# Patient Record
Sex: FEMALE | Race: ASIAN | NOT HISPANIC OR LATINO | Employment: OTHER | URBAN - METROPOLITAN AREA
[De-identification: names, ages, dates, MRNs, and addresses within clinical notes are randomized per-mention and may not be internally consistent; named-entity substitution may affect disease eponyms.]

---

## 2021-04-08 ENCOUNTER — TRANSFERRED RECORDS (OUTPATIENT)
Dept: HEALTH INFORMATION MANAGEMENT | Facility: CLINIC | Age: 31
End: 2021-04-08

## 2022-08-03 ENCOUNTER — TELEPHONE (OUTPATIENT)
Dept: FAMILY MEDICINE | Facility: CLINIC | Age: 32
End: 2022-08-03

## 2022-08-03 NOTE — TELEPHONE ENCOUNTER
OK to change PCP to me.   Please notify that it might take some time to get in to see me and they can see my partners for acute issue if arises.

## 2022-08-03 NOTE — TELEPHONE ENCOUNTER
Reason for Call:  Accepting new patients    Detailed comments: Garth,  of patient, is established with you and would like patient to see you as well. Was told by schedulers your panel is closed. Asked to send request to see if you would be willing to accept her as patient as well. Please advise-thanks!      Phone Number Patient can be reached at: Home number on file [phone] (home)    Best Time: Open    Can we leave a detailed message on this number? YES    Call taken on 8/3/2022 at 2:41 PM by Marla Osuna

## 2022-10-03 ENCOUNTER — HEALTH MAINTENANCE LETTER (OUTPATIENT)
Age: 32
End: 2022-10-03

## 2022-11-22 ENCOUNTER — OFFICE VISIT (OUTPATIENT)
Dept: FAMILY MEDICINE | Facility: CLINIC | Age: 32
End: 2022-11-22
Payer: COMMERCIAL

## 2022-11-22 VITALS
WEIGHT: 158.2 LBS | OXYGEN SATURATION: 99 % | HEIGHT: 63 IN | SYSTOLIC BLOOD PRESSURE: 110 MMHG | DIASTOLIC BLOOD PRESSURE: 76 MMHG | HEART RATE: 109 BPM | TEMPERATURE: 98.5 F | RESPIRATION RATE: 18 BRPM | BODY MASS INDEX: 28.03 KG/M2

## 2022-11-22 DIAGNOSIS — Z13.1 SCREENING FOR DIABETES MELLITUS: ICD-10-CM

## 2022-11-22 DIAGNOSIS — Z11.4 SCREENING FOR HIV (HUMAN IMMUNODEFICIENCY VIRUS): ICD-10-CM

## 2022-11-22 DIAGNOSIS — Z00.00 ROUTINE GENERAL MEDICAL EXAMINATION AT A HEALTH CARE FACILITY: Primary | ICD-10-CM

## 2022-11-22 DIAGNOSIS — Z13.0 SCREENING, ANEMIA, DEFICIENCY, IRON: ICD-10-CM

## 2022-11-22 DIAGNOSIS — Z13.29 SCREENING FOR THYROID DISORDER: ICD-10-CM

## 2022-11-22 DIAGNOSIS — Z13.220 SCREENING FOR HYPERLIPIDEMIA: ICD-10-CM

## 2022-11-22 DIAGNOSIS — Z11.59 NEED FOR HEPATITIS C SCREENING TEST: ICD-10-CM

## 2022-11-22 LAB
ERYTHROCYTE [DISTWIDTH] IN BLOOD BY AUTOMATED COUNT: 14.9 % (ref 10–15)
HCT VFR BLD AUTO: 34.5 % (ref 35–47)
HGB BLD-MCNC: 10.7 G/DL (ref 11.7–15.7)
MCH RBC QN AUTO: 23.1 PG (ref 26.5–33)
MCHC RBC AUTO-ENTMCNC: 31 G/DL (ref 31.5–36.5)
MCV RBC AUTO: 75 FL (ref 78–100)
PLATELET # BLD AUTO: 283 10E3/UL (ref 150–450)
RBC # BLD AUTO: 4.63 10E6/UL (ref 3.8–5.2)
WBC # BLD AUTO: 6.9 10E3/UL (ref 4–11)

## 2022-11-22 PROCEDURE — 86803 HEPATITIS C AB TEST: CPT | Performed by: FAMILY MEDICINE

## 2022-11-22 PROCEDURE — 80061 LIPID PANEL: CPT | Performed by: FAMILY MEDICINE

## 2022-11-22 PROCEDURE — 80053 COMPREHEN METABOLIC PANEL: CPT | Performed by: FAMILY MEDICINE

## 2022-11-22 PROCEDURE — 99385 PREV VISIT NEW AGE 18-39: CPT | Mod: 25 | Performed by: FAMILY MEDICINE

## 2022-11-22 PROCEDURE — 90471 IMMUNIZATION ADMIN: CPT | Performed by: FAMILY MEDICINE

## 2022-11-22 PROCEDURE — 87389 HIV-1 AG W/HIV-1&-2 AB AG IA: CPT | Performed by: FAMILY MEDICINE

## 2022-11-22 PROCEDURE — 84443 ASSAY THYROID STIM HORMONE: CPT | Performed by: FAMILY MEDICINE

## 2022-11-22 PROCEDURE — 85027 COMPLETE CBC AUTOMATED: CPT | Performed by: FAMILY MEDICINE

## 2022-11-22 PROCEDURE — 90715 TDAP VACCINE 7 YRS/> IM: CPT | Performed by: FAMILY MEDICINE

## 2022-11-22 PROCEDURE — 36415 COLL VENOUS BLD VENIPUNCTURE: CPT | Performed by: FAMILY MEDICINE

## 2022-11-22 ASSESSMENT — ENCOUNTER SYMPTOMS
CONSTIPATION: 0
FREQUENCY: 0
NAUSEA: 0
MYALGIAS: 0
COUGH: 0
SORE THROAT: 0
NERVOUS/ANXIOUS: 1
JOINT SWELLING: 0
ARTHRALGIAS: 0
DIZZINESS: 0
DIARRHEA: 0
CHILLS: 0
EYE PAIN: 0
HEMATOCHEZIA: 0
ABDOMINAL PAIN: 0
PALPITATIONS: 0
HEMATURIA: 0
WEAKNESS: 0
DYSURIA: 0
SHORTNESS OF BREATH: 0
HEARTBURN: 0
HEADACHES: 1
PARESTHESIAS: 0
FEVER: 0

## 2022-11-22 NOTE — NURSING NOTE
Prior to immunization administration, verified patients identity using patient s name and date of birth. Please see Immunization Activity for additional information.     Screening Questionnaire for Adult Immunization    Are you sick today?   No   Do you have allergies to medications, food, a vaccine component or latex?   No   Have you ever had a serious reaction after receiving a vaccination?   No   Do you have a long-term health problem with heart, lung, kidney, or metabolic disease (e.g., diabetes), asthma, a blood disorder, no spleen, complement component deficiency, a cochlear implant, or a spinal fluid leak?  Are you on long-term aspirin therapy?   No   Do you have cancer, leukemia, HIV/AIDS, or any other immune system problem?   No   Do you have a parent, brother, or sister with an immune system problem?   No   In the past 3 months, have you taken medications that affect  your immune system, such as prednisone, other steroids, or anticancer drugs; drugs for the treatment of rheumatoid arthritis, Crohn s disease, or psoriasis; or have you had radiation treatments?   No   Have you had a seizure, or a brain or other nervous system problem?   No   During the past year, have you received a transfusion of blood or blood    products, or been given immune (gamma) globulin or antiviral drug?   No   For women: Are you pregnant or is there a chance you could become       pregnant during the next month?   No   Have you received any vaccinations in the past 4 weeks?   No     Immunization questionnaire answers were all negative.        Per orders of Dr. brunson, injection of tdap given by Anitha Vela MA. Patient instructed to remain in clinic for 15 minutes afterwards, and to report any adverse reaction to me immediately.       Screening performed by Anitha Vela MA on 11/22/2022 at 5:11 PM.

## 2022-11-22 NOTE — PROGRESS NOTES
SUBJECTIVE:   CC: Jennifer is an 32 year old who presents for preventive health visit.      Patient has been advised of split billing requirements and indicates understanding: Yes  Healthy Habits:     Getting at least 3 servings of Calcium per day:  Yes    Bi-annual eye exam:  NO    Dental care twice a year:  NO    Sleep apnea or symptoms of sleep apnea:  None    Diet:  Regular (no restrictions)    Frequency of exercise:  1 day/week    Duration of exercise:  15-30 minutes    Taking medications regularly:  Yes    Medication side effects:  Not applicable    PHQ-2 Total Score: 0    Additional concerns today:  No    Today's PHQ-2 Score:   PHQ-2 ( 1999 Pfizer) 11/22/2022   Q1: Little interest or pleasure in doing things 0   Q2: Feeling down, depressed or hopeless 0   PHQ-2 Score 0   Q1: Little interest or pleasure in doing things Not at all   Q2: Feeling down, depressed or hopeless Not at all   PHQ-2 Score 0     Have you ever done Advance Care Planning? (For example, a Health Directive, POLST, or a discussion with a medical provider or your loved ones about your wishes): No, advance care planning information given to patient to review.  Patient declined advance care planning discussion at this time.    Social History     Tobacco Use     Smoking status: Never     Smokeless tobacco: Never   Substance Use Topics     Alcohol use: Not on file     If you drink alcohol do you typically have >3 drinks per day or >7 drinks per week? No    Alcohol Use 11/22/2022   Prescreen: >3 drinks/day or >7 drinks/week? No   Prescreen: >3 drinks/day or >7 drinks/week? -   No flowsheet data found.    Reviewed orders with patient.  Reviewed health maintenance and updated orders accordingly - Yes       Breast Cancer Screening:    Breast CA Risk Assessment (FHS-7) 11/22/2022   Do you have a family history of breast, colon, or ovarian cancer? No / Unknown         Patient under 40 years of age: Routine Mammogram Screening not recommended.  "  Pertinent mammograms are reviewed under the imaging tab.    History of abnormal Pap smear: NO - age 30-65 PAP every 5 years with negative HPV co-testing recommended     Reviewed and updated as needed this visit by clinical staff   Tobacco  Allergies  Meds            Reviewed and updated as needed this visit by Provider    Here with her 3 yo daughter and  Garth.  my patient.     Mother - thyroid, blood pressure.   Father - thyroid and blood pressure.   From [facility]. [place] language but understands English.     Periods regular. No birth control pills.  is using condoms. Wishes to hold off on pap until next time as she is having her mensis.     Was breast feeding but stopped recently. Daughter is 2 years old. Some fatigue lately.     Flu shot through. [facility] day care - just joined. Will start next week.     Living with  and 3 yo daughter.     covid shots in [place].     Review of Systems   Constitutional: Negative for chills and fever.   HENT: Negative for congestion, ear pain, hearing loss and sore throat.    Eyes: Negative for pain and visual disturbance.   Respiratory: Negative for cough and shortness of breath.    Cardiovascular: Negative for chest pain, palpitations and peripheral edema.   Gastrointestinal: Negative for abdominal pain, constipation, diarrhea, heartburn, hematochezia and nausea.   Genitourinary: Negative for dysuria, frequency, genital sores, hematuria and urgency.   Musculoskeletal: Negative for arthralgias, joint swelling and myalgias.   Skin: Negative for rash.   Neurological: Positive for headaches. Negative for dizziness, weakness and paresthesias.   Psychiatric/Behavioral: Positive for mood changes. The patient is nervous/anxious.      OBJECTIVE:   /76 (BP Location: Right arm, Patient Position: Sitting, Cuff Size: Adult Regular)   Pulse 109   Temp 98.5  F (36.9  C) (Temporal)   Resp 18   Ht 1.6 m (5' 2.99\")   Wt 71.8 kg (158 lb 3.2 oz)   SpO2 99%   " BMI 28.03 kg/m    Physical Exam  GENERAL: healthy, alert and no distress  EYES: Eyes grossly normal to inspection, PERRL and conjunctivae and sclerae normal  HENT: ear canals and TM's normal, nose and mouth without ulcers or lesions  NECK: no adenopathy, no asymmetry, masses, or scars and thyroid normal to palpation  RESP: lungs clear to auscultation - no rales, rhonchi or wheezes  CV: regular rate and rhythm, normal S1 S2, no S3 or S4, no murmur, click or rub, no peripheral edema and peripheral pulses strong  ABDOMEN: soft, nontender, no hepatosplenomegaly, no masses and bowel sounds normal  MS: no gross musculoskeletal defects noted, no edema  SKIN: no suspicious lesions or rashes  NEURO: Normal strength and tone, mentation intact and speech normal  PSYCH: mentation appears normal, affect normal/bright    ASSESSMENT/PLAN:   (Z00.00) Routine general medical examination at a health care facility  (primary encounter diagnosis)  Comment:    Plan:  Discussed we can still obtain pap despite mensis. Patient declined. Discussed reliable methods of contraception. They would like to think about it.     (Z11.4) Screening for HIV (human immunodeficiency virus)  Comment:    Plan: HIV Antigen Antibody Combo             (Z11.59) Need for hepatitis C screening test  Comment:    Plan: Hepatitis C Screen Reflex to HCV RNA Quant and         Genotype    (Z13.29) Screening for thyroid disorder  Comment:    Plan: TSH with free T4 reflex             (Z13.0) Screening, anemia, deficiency, iron  Comment:    Plan: CBC with platelets             (Z13.1) Screening for diabetes mellitus  Comment:    Plan: Comprehensive metabolic panel (BMP + Alb, Alk         Phos, ALT, AST, Total. Bili, TP), CANCELED:         Glucose             (Z13.220) Screening for hyperlipidemia  Comment:    Plan: Lipid panel reflex to direct LDL Non-fasting                COUNSELING:  Reviewed preventive health counseling, as reflected in patient instructions    She  reports that she has never smoked. She has never used smokeless tobacco.     Brett La MD, MD  [facility]

## 2022-11-23 LAB
ALBUMIN SERPL BCG-MCNC: 4.3 G/DL (ref 3.5–5.2)
ALP SERPL-CCNC: 108 U/L (ref 35–104)
ALT SERPL W P-5'-P-CCNC: 19 U/L (ref 10–35)
ANION GAP SERPL CALCULATED.3IONS-SCNC: 12 MMOL/L (ref 7–15)
AST SERPL W P-5'-P-CCNC: 21 U/L (ref 10–35)
BILIRUB SERPL-MCNC: 0.2 MG/DL
BUN SERPL-MCNC: 17.9 MG/DL (ref 6–20)
CALCIUM SERPL-MCNC: 8.9 MG/DL (ref 8.6–10)
CHLORIDE SERPL-SCNC: 101 MMOL/L (ref 98–107)
CHOLEST SERPL-MCNC: 180 MG/DL
CREAT SERPL-MCNC: 0.64 MG/DL (ref 0.51–0.95)
DEPRECATED HCO3 PLAS-SCNC: 22 MMOL/L (ref 22–29)
GFR SERPL CREATININE-BSD FRML MDRD: >90 ML/MIN/1.73M2
GLUCOSE SERPL-MCNC: 86 MG/DL (ref 70–99)
HCV AB SERPL QL IA: NONREACTIVE
HDLC SERPL-MCNC: 40 MG/DL
HIV 1+2 AB+HIV1 P24 AG SERPL QL IA: NONREACTIVE
LDLC SERPL CALC-MCNC: 111 MG/DL
NONHDLC SERPL-MCNC: 140 MG/DL
POTASSIUM SERPL-SCNC: 4.1 MMOL/L (ref 3.4–5.3)
PROT SERPL-MCNC: 7.5 G/DL (ref 6.4–8.3)
SODIUM SERPL-SCNC: 135 MMOL/L (ref 136–145)
TRIGL SERPL-MCNC: 145 MG/DL
TSH SERPL DL<=0.005 MIU/L-ACNC: 3.35 UIU/ML (ref 0.3–4.2)

## 2023-10-04 ENCOUNTER — OFFICE VISIT (OUTPATIENT)
Dept: FAMILY MEDICINE | Facility: CLINIC | Age: 33
End: 2023-10-04
Payer: COMMERCIAL

## 2023-10-04 VITALS
RESPIRATION RATE: 20 BRPM | HEIGHT: 64 IN | SYSTOLIC BLOOD PRESSURE: 112 MMHG | OXYGEN SATURATION: 99 % | TEMPERATURE: 97.3 F | BODY MASS INDEX: 26.8 KG/M2 | WEIGHT: 157 LBS | DIASTOLIC BLOOD PRESSURE: 78 MMHG | HEART RATE: 94 BPM

## 2023-10-04 DIAGNOSIS — J06.9 UPPER RESPIRATORY TRACT INFECTION, UNSPECIFIED TYPE: Primary | ICD-10-CM

## 2023-10-04 PROCEDURE — 99213 OFFICE O/P EST LOW 20 MIN: CPT | Performed by: FAMILY MEDICINE

## 2023-10-04 ASSESSMENT — PAIN SCALES - GENERAL: PAINLEVEL: MODERATE PAIN (5)

## 2023-10-04 NOTE — PROGRESS NOTES
"  Assessment & Plan     Upper respiratory tract infection, unspecified type  Repeat home covid test. Home care instructions given. Offered in clinic covid test, they would like to hold off on that. When to seek follow up care discussed.              BMI:   Estimated body mass index is 26.95 kg/m  as calculated from the following:    Height as of this encounter: 1.626 m (5' 4\").    Weight as of this encounter: 71.2 kg (157 lb).           Brett La MD, MD  [facility]    Jerry Rodriguez is a 33 year old, presenting for the following health issues:  Suspected Covid (Pain in head, jaw area, runny nose, cough and sore throat. )      10/4/2023     3:02 PM   Additional Questions   Roomed by Jammie KEEN     History of Present Illness       Reason for visit:  Sore throat and cough    She eats 2-3 servings of fruits and vegetables daily.She consumes 0 sweetened beverage(s) daily.She exercises with enough effort to increase her heart rate 10 to 19 minutes per day.  She exercises with enough effort to increase her heart rate 7 days per week.   She is taking medications regularly.     Going on for last 3 days. Jaw, ear and head pain.   No light sensitivity.   More headaches with lying down. More on left side of face.   Cough with mucus production.   No fever. Chest pain with coughing. No recent travel.  Child does not go to .   Did not take any medication. Home covid test negative yesterday.     Review of Systems         Objective    /78 (BP Location: Left arm, Patient Position: Sitting, Cuff Size: Adult Regular)   Pulse 94   Temp 97.3  F (36.3  C) (Temporal)   Resp 20   Ht 1.626 m (5' 4\")   Wt 71.2 kg (157 lb)   LMP 09/20/2023 (Exact Date)   SpO2 99%   BMI 26.95 kg/m    Body mass index is 26.95 kg/m .  Physical Exam   Lungs clear  Heart RRR  Mild cervical adenopathy  No sinus tenderness.  Both TM clear.                       "

## 2023-10-04 NOTE — PATIENT INSTRUCTIONS
Cough - nyquil for cough during night time and dayquil during daytime.   Tylenol or ibuprofen for pain.   Honey and lime as needed for cough and throat.   If not improving in 14 days or if getting worse - follow up.     For ear wax - debrox ear drops if needed. You can always use any oil in your ear before going to bed and ok to put water next day to clean your ears.     =======================================  FYI:     System will autorelease results as soon as they are available. I usually wait for all results to be ready before sending you a comment or message.  Be assured I will review and comment on all of your results as soon as I can.     I am at [facility]  ([phone]) usually Monday, Tuesday and Wednesday.   Messages, evisits, phone calls received on Thursday and Friday may not get responded very urgently but I will try to respond as soon as possible.     2) My schedule sometime been booking out far in advance. I apologize for the lack of timely access. If you need to be seen for a chronic condition or preventive (wellness) visit, please be sure to schedule that appointment few months in advance.  If you have a concern that you feel cannot wait until my next available appointment (such as a hospital follow-up or new symptom of concern) please ask to speak to one of the [facility] nurses who may be able to access a sooner appointment.      You can schedule a video visit for follow-up appointments as well as future appointments for certain conditions.  Please see the below link.     Video Visits ([email or web address])     If you have not already done so,  I encourage you to sign up for [employer] ([email or web address]).  This will allow you to review your results, securely communicate with a provider, and schedule virtual visits as well.

## 2023-10-09 ENCOUNTER — MYC MEDICAL ADVICE (OUTPATIENT)
Dept: FAMILY MEDICINE | Facility: CLINIC | Age: 33
End: 2023-10-09
Payer: COMMERCIAL

## 2023-10-10 ENCOUNTER — NURSE TRIAGE (OUTPATIENT)
Dept: FAMILY MEDICINE | Facility: CLINIC | Age: 33
End: 2023-10-10
Payer: COMMERCIAL

## 2023-10-10 ENCOUNTER — NURSE TRIAGE (OUTPATIENT)
Dept: NURSING | Facility: CLINIC | Age: 33
End: 2023-10-10
Payer: COMMERCIAL

## 2023-10-10 NOTE — TELEPHONE ENCOUNTER
E visit advised for red eye , with caution for reasons to be seen immediately    .Shilpi Barr RN, BSN  [facility]

## 2023-10-10 NOTE — TELEPHONE ENCOUNTER
Patient was already triaged by RN today.    Called patient's cell number: phone number not in service.    Called patient's cell number and spoke with patient:  Informed patient unfortunately no clinic appointments available today  Discussed [facility] as an option; reviewed Urgent Care location and hours    Patient verbalized understanding and in agreement with plan.    TERESITA ChoeN, RN-[facility]

## 2023-10-10 NOTE — TELEPHONE ENCOUNTER
Reason for Call:  Appointment Request    Patient requesting this type of appt:  Lft eye redness    Requested provider: Brett La    Reason patient unable to be scheduled: Not within requested timeframe    When does patient want to be seen/preferred time: Same day    Comments: Patient asking for same day for eye redness    Could we send this information to you in [facility] or would you prefer to receive a phone call?:   Patient would prefer a phone call   Okay to leave a detailed message?: Yes at Cell number on file:    Telephone Information:   Mobile [phone]       Call taken on 10/10/2023 at 8:10 AM by Paola Blount

## 2023-10-10 NOTE — TELEPHONE ENCOUNTER
Was seen 10/4/23. Dx with URI.  Treated with OTC's Dayquil and Nyquil.  Symptoms resolved.    Left eye is red with yellow discharge, now x three days.  Per the protocol, I recommended she be seen today.  Caller stated understanding and agreement.  Understands too that if there are not appointments available she should go to an  or [facility].  Call back as needed.  Transferred to scheduling.       Reason for Disposition   Eye with yellow or green discharge or eyelashes stick together, but NO standing order to call in antibiotic eye drops  (Exception: Artemio; continue triage.)    Additional Information   Negative: Eye exposure to chemical or fumes   Negative: Redness of white of eye (sclera), but no pus or only a small amount of brief pus   Negative: SEVERE pain (e.g., excruciating)   Negative: Patient sounds very sick or weak to the triager   Negative: MODERATE eye pain (e.g., interferes with normal activities)   Negative: Looking at light causes MODERATE to SEVERE eye pain (i.e., photophobia)   Negative: Blurred vision   Negative: Cloudy spot or sore seen on the cornea (clear part of the eye)   Negative: Eyelids are very swollen (shut or almost)   Negative: Eyelid (outer) is very red and painful (or tender to touch)   Negative: Fever > 103 F (39.4 C)   Negative: Fever present > 3 days (72 hours)   Negative: Bleeding on white of the eye    Protocols used: Eye - Pus or [id number]  Rosa Elena GARCÍA RN [facility]

## 2023-10-23 ENCOUNTER — PATIENT OUTREACH (OUTPATIENT)
Dept: CARE COORDINATION | Facility: CLINIC | Age: 33
End: 2023-10-23
Payer: COMMERCIAL

## 2023-11-06 ENCOUNTER — PATIENT OUTREACH (OUTPATIENT)
Dept: CARE COORDINATION | Facility: CLINIC | Age: 33
End: 2023-11-06
Payer: COMMERCIAL

## 2023-12-31 ENCOUNTER — HEALTH MAINTENANCE LETTER (OUTPATIENT)
Age: 33
End: 2023-12-31

## 2024-02-07 ENCOUNTER — OFFICE VISIT (OUTPATIENT)
Dept: FAMILY MEDICINE | Facility: CLINIC | Age: 34
End: 2024-02-07
Payer: COMMERCIAL

## 2024-02-07 VITALS
HEIGHT: 64 IN | BODY MASS INDEX: 27.14 KG/M2 | RESPIRATION RATE: 16 BRPM | DIASTOLIC BLOOD PRESSURE: 71 MMHG | HEART RATE: 87 BPM | OXYGEN SATURATION: 97 % | SYSTOLIC BLOOD PRESSURE: 107 MMHG | TEMPERATURE: 98 F | WEIGHT: 159 LBS

## 2024-02-07 DIAGNOSIS — L98.9 SKIN LESION: Primary | ICD-10-CM

## 2024-02-07 PROCEDURE — 90480 ADMN SARSCOV2 VAC 1/ONLY CMP: CPT | Performed by: FAMILY MEDICINE

## 2024-02-07 PROCEDURE — 90686 IIV4 VACC NO PRSV 0.5 ML IM: CPT | Performed by: FAMILY MEDICINE

## 2024-02-07 PROCEDURE — 91320 SARSCV2 VAC 30MCG TRS-SUC IM: CPT | Performed by: FAMILY MEDICINE

## 2024-02-07 PROCEDURE — 99213 OFFICE O/P EST LOW 20 MIN: CPT | Mod: 25 | Performed by: FAMILY MEDICINE

## 2024-02-07 PROCEDURE — 90471 IMMUNIZATION ADMIN: CPT | Performed by: FAMILY MEDICINE

## 2024-02-07 RX ORDER — TRIAMCINOLONE ACETONIDE 1 MG/G
CREAM TOPICAL 2 TIMES DAILY
Qty: 15 G | Refills: 0 | Status: SHIPPED | OUTPATIENT
Start: 2024-02-07 | End: 2024-04-01

## 2024-02-07 NOTE — PROGRESS NOTES
"  Assessment & Plan     Skin lesion  Suspect eczema and worsening with daily mask use. Discussed mask clips and avoiding irritaiton from mask. Also trial of steroid for 2 weeks. If not improving - consider derm referral. Patient and  understood. Will contact me with questions or if referral needed.   - triamcinolone (KENALOG) 0.1 % external cream; Apply topically 2 times daily For up to 1 month.            BMI  Estimated body mass index is 27.48 kg/m  as calculated from the following:    Height as of this encounter: 1.62 m (5' 3.78\").    Weight as of this encounter: 72.1 kg (159 lb).       Encourage to come back for pap. Can see my female partners if interested.     Jerry Rodriguez is a 33 year old, presenting for the following health issues:  Ear Problem (spot behind ear)      2/7/2024     3:13 PM   Additional Questions   Roomed by Rigoberto   Accompanied by      History of Present Illness       Reason for visit:  Ear problem  Symptom onset:  3-7 days ago  Symptom progression:  Staying the same    She eats 4 or more servings of fruits and vegetables daily.She consumes 0 sweetened beverage(s) daily.She exercises with enough effort to increase her heart rate 20 to 29 minutes per day.  She exercises with enough effort to increase her heart rate 5 days per week.   She is taking medications regularly.     Left ear - itchy rash.   Uses mask at work.     Sometime back stiffness. Nothing serious.         Objective    /71 (BP Location: Right arm, Patient Position: Sitting, Cuff Size: Adult Regular)   Pulse 87   Temp 98  F (36.7  C) (Temporal)   Resp 16   Ht 1.62 m (5' 3.78\")   Wt 72.1 kg (159 lb)   LMP 01/14/2024   SpO2 97%   BMI 27.48 kg/m    Body mass index is 27.48 kg/m .  Physical Exam   Eczematous lesion on left ear lobe.  Minimal skin changes in ear canal too.             Signed Electronically by: Brett La MD, MD    "

## 2024-03-21 ENCOUNTER — TELEPHONE (OUTPATIENT)
Dept: OBGYN | Facility: CLINIC | Age: 34
End: 2024-03-21
Payer: COMMERCIAL

## 2024-03-21 NOTE — TELEPHONE ENCOUNTER
" Health Call Center    Phone Message    May a detailed message be left on voicemail: no     Reason for Call: Symptoms or Concerns     If patient has red-flag symptoms, warm transfer to triage line    Current symptom or concern: \"acidity issues\" (heartburn?), and nausea related to pregnancy    Symptoms have been present for:  10 day(s)    Has patient previously been seen for this? No    Are there any new or worsening symptoms? No      Action Taken: Message routed to:  Other: RD OB    Travel Screening: Not Applicable                                                                   "

## 2024-04-01 ENCOUNTER — VIRTUAL VISIT (OUTPATIENT)
Dept: OBGYN | Facility: CLINIC | Age: 34
End: 2024-04-01
Payer: COMMERCIAL

## 2024-04-01 DIAGNOSIS — Z34.90 ENCOUNTER FOR SUPERVISION OF NORMAL PREGNANCY: Primary | ICD-10-CM

## 2024-04-01 DIAGNOSIS — R17 CHOLESTATIC JAUNDICE DURING PREGNANCY IN THIRD TRIMESTER: ICD-10-CM

## 2024-04-01 DIAGNOSIS — Z83.49 FAMILY HISTORY OF THYROID DISEASE IN MOTHER: ICD-10-CM

## 2024-04-01 DIAGNOSIS — O99.891 CHOLESTATIC JAUNDICE DURING PREGNANCY IN THIRD TRIMESTER: ICD-10-CM

## 2024-04-01 DIAGNOSIS — Z23 NEED FOR TDAP VACCINATION: ICD-10-CM

## 2024-04-01 PROBLEM — Z87.898 HISTORY OF JAUNDICE: Status: ACTIVE | Noted: 2024-04-01

## 2024-04-01 LAB
ABO/RH(D): NORMAL
ANTIBODY SCREEN: NEGATIVE
SPECIMEN EXPIRATION DATE: NORMAL

## 2024-04-01 PROCEDURE — 99207 PR NO CHARGE NURSE ONLY: CPT | Mod: 93

## 2024-04-01 RX ORDER — PYRIDOXINE HCL (VITAMIN B6) 25 MG
25 TABLET ORAL 3 TIMES DAILY
COMMUNITY
End: 2024-07-09

## 2024-04-01 NOTE — PROGRESS NOTES
Important Information for Provider:     New ob nurse intake by phone,second pregnancy. C section at 39 weeks,  was a planned induction at first, patient states that she had jaundice in her third trimester so that's why it was a planned induction( patient had her baby in [place])  Patient states that her LMP 1/14/2024( not 2/06/2024 which was given when she called for her appointments). Ultrasound scheduled for 4/02/2024 with Cookie to review results after. NOB with CNM 4/18/2024. Handouts reviewed. Declined genetic screening.     Ordered liver function test (  jaundice previous pregnancy), TSH( family history)    Caffeine intake/servings daily - 0  Calcium intake/servings daily - 3  Exercise 5 times weekly - describe ; yoga, precautions given  Sunscreen used - Yes  Seatbelts used - Yes  Guns stored in the home - No  Self Breast Exam - Yes  Pap test up to date -  No  Immunizations reviewed and up to date - Yes  Abuse: Current or Past (Physical, Sexual or Emotional) - No  Do you feel safe in your environment - Yes  Do you cope well with stress - Yes      Prenatal OB Questionnaire  Patient supplied answers from flow sheet for:  Prenatal OB Questionnaire.  Past Medical History  Have you ever recieved care for your mental health? : No  Have you ever been in a major accident or suffered serious trauma?: No  Within the last year, has anyone hit, slapped, kicked or otherwise hurt you?: No  In the last year, has anyone forced you to have sex when you didn't want to?: No    Past Medical History 2   Have you ever received a blood transfusion?: No  Would you accept a blood transfusion if was medically recommended?: Yes  Does anyone in your home smoke?: No   Is your blood type Rh negative?: Unknown  Have you ever ?: (!) Yes  Have you been hospitalized for a nonsurgical reason excluding normal delivery?: No  Have you ever had an abnormal pap smear?: No    Past Medical History (Continued)  Do you have a history of  abnormalities of the uterus?: No  Did your mother take KAITLIN or any other hormones when she was pregnant with you?: No  Do you have any other problems we have not asked about which you feel may be important to this pregnancy?: No                     Allergies as of 4/1/2024:    Allergies as of 04/01/2024 - Reviewed 02/07/2024   Allergen Reaction Noted    Penicillin g  11/22/2022           Early ultrasound screening tool:    Does patient have irregular periods?  No  Did patient use hormonal birth control in the three months prior to positive urine pregnancy test? No  Is the patient breastfeeding?  No  Is the patient 10 weeks or greater at time of education visit?  Yes

## 2024-04-02 ENCOUNTER — OFFICE VISIT (OUTPATIENT)
Dept: OBGYN | Facility: CLINIC | Age: 34
End: 2024-04-02
Payer: COMMERCIAL

## 2024-04-02 ENCOUNTER — ANCILLARY PROCEDURE (OUTPATIENT)
Dept: ULTRASOUND IMAGING | Facility: CLINIC | Age: 34
End: 2024-04-02
Attending: OBSTETRICS & GYNECOLOGY
Payer: COMMERCIAL

## 2024-04-02 VITALS
OXYGEN SATURATION: 100 % | DIASTOLIC BLOOD PRESSURE: 76 MMHG | HEART RATE: 83 BPM | SYSTOLIC BLOOD PRESSURE: 123 MMHG | BODY MASS INDEX: 27.74 KG/M2 | WEIGHT: 160.5 LBS

## 2024-04-02 DIAGNOSIS — O99.011 ANEMIA DURING PREGNANCY IN FIRST TRIMESTER: Primary | ICD-10-CM

## 2024-04-02 DIAGNOSIS — Z83.49 FAMILY HISTORY OF THYROID DISEASE IN MOTHER: ICD-10-CM

## 2024-04-02 DIAGNOSIS — O99.011 ANEMIA DURING PREGNANCY IN FIRST TRIMESTER: ICD-10-CM

## 2024-04-02 DIAGNOSIS — Z34.01 ENCOUNTER FOR SUPERVISION OF NORMAL FIRST PREGNANCY IN FIRST TRIMESTER: Primary | ICD-10-CM

## 2024-04-02 DIAGNOSIS — Z34.90 ENCOUNTER FOR SUPERVISION OF NORMAL PREGNANCY: ICD-10-CM

## 2024-04-02 LAB
ALBUMIN SERPL BCG-MCNC: 3.9 G/DL (ref 3.5–5.2)
ALBUMIN UR-MCNC: NEGATIVE MG/DL
ALP SERPL-CCNC: 123 U/L (ref 40–150)
ALT SERPL W P-5'-P-CCNC: 36 U/L (ref 0–50)
APPEARANCE UR: CLEAR
AST SERPL W P-5'-P-CCNC: 19 U/L (ref 0–45)
BILIRUB DIRECT SERPL-MCNC: <0.2 MG/DL (ref 0–0.3)
BILIRUB SERPL-MCNC: 0.3 MG/DL
BILIRUB UR QL STRIP: NEGATIVE
COLOR UR AUTO: YELLOW
ERYTHROCYTE [DISTWIDTH] IN BLOOD BY AUTOMATED COUNT: 15.1 % (ref 10–15)
FERRITIN SERPL-MCNC: 12 NG/ML (ref 6–175)
GLUCOSE UR STRIP-MCNC: NEGATIVE MG/DL
HBV SURFACE AG SERPL QL IA: NONREACTIVE
HCT VFR BLD AUTO: 33.8 % (ref 35–47)
HCV AB SERPL QL IA: NONREACTIVE
HGB BLD-MCNC: 10.7 G/DL (ref 11.7–15.7)
HGB UR QL STRIP: NEGATIVE
HIV 1+2 AB+HIV1 P24 AG SERPL QL IA: NONREACTIVE
IRON BINDING CAPACITY (ROCHE): 483 UG/DL (ref 240–430)
IRON SATN MFR SERPL: 10 % (ref 15–46)
IRON SERPL-MCNC: 49 UG/DL (ref 37–145)
KETONES UR STRIP-MCNC: NEGATIVE MG/DL
LEUKOCYTE ESTERASE UR QL STRIP: NEGATIVE
MCH RBC QN AUTO: 23.5 PG (ref 26.5–33)
MCHC RBC AUTO-ENTMCNC: 31.7 G/DL (ref 31.5–36.5)
MCV RBC AUTO: 74 FL (ref 78–100)
NITRATE UR QL: NEGATIVE
PH UR STRIP: 6.5 [PH] (ref 5–7)
PLATELET # BLD AUTO: 218 10E3/UL (ref 150–450)
PROT SERPL-MCNC: 7.4 G/DL (ref 6.4–8.3)
RBC # BLD AUTO: 4.55 10E6/UL (ref 3.8–5.2)
RUBV IGG SERPL QL IA: 27.1 INDEX
RUBV IGG SERPL QL IA: POSITIVE
SP GR UR STRIP: <=1.005 (ref 1–1.03)
T PALLIDUM AB SER QL: NONREACTIVE
TSH SERPL DL<=0.005 MIU/L-ACNC: 2.21 UIU/ML (ref 0.3–4.2)
UROBILINOGEN UR STRIP-ACNC: 0.2 E.U./DL
WBC # BLD AUTO: 6.5 10E3/UL (ref 4–11)

## 2024-04-02 PROCEDURE — 86803 HEPATITIS C AB TEST: CPT

## 2024-04-02 PROCEDURE — 2894A TYPE AND SCREEN, ADULT: CPT

## 2024-04-02 PROCEDURE — 76801 OB US < 14 WKS SINGLE FETUS: CPT | Performed by: OBSTETRICS & GYNECOLOGY

## 2024-04-02 PROCEDURE — 99203 OFFICE O/P NEW LOW 30 MIN: CPT

## 2024-04-02 PROCEDURE — 81003 URINALYSIS AUTO W/O SCOPE: CPT

## 2024-04-02 PROCEDURE — 86780 TREPONEMA PALLIDUM: CPT

## 2024-04-02 PROCEDURE — 87389 HIV-1 AG W/HIV-1&-2 AB AG IA: CPT

## 2024-04-02 PROCEDURE — 2894A RUBELLA ANTIBODY IGG: CPT

## 2024-04-02 PROCEDURE — 87086 URINE CULTURE/COLONY COUNT: CPT

## 2024-04-02 PROCEDURE — 80076 HEPATIC FUNCTION PANEL: CPT

## 2024-04-02 PROCEDURE — 83550 IRON BINDING TEST: CPT

## 2024-04-02 PROCEDURE — 85027 COMPLETE CBC AUTOMATED: CPT

## 2024-04-02 PROCEDURE — 82728 ASSAY OF FERRITIN: CPT

## 2024-04-02 PROCEDURE — 36415 COLL VENOUS BLD VENIPUNCTURE: CPT

## 2024-04-02 PROCEDURE — 2894A HEPATITIS B SURFACE ANTIGEN: CPT

## 2024-04-02 PROCEDURE — 84443 ASSAY THYROID STIM HORMONE: CPT

## 2024-04-02 PROCEDURE — 83540 ASSAY OF IRON: CPT

## 2024-04-02 RX ORDER — PRENATAL VIT/IRON FUM/FOLIC AC 27MG-0.8MG
1 TABLET ORAL DAILY
Qty: 90 TABLET | Refills: 3 | Status: SHIPPED | OUTPATIENT
Start: 2024-04-02

## 2024-04-02 NOTE — PROGRESS NOTES
CC: early US review  S: Jennifer is a  at 11.2 ega by lmp 24 with brad 10/20/24  -regular periods  -headache, fatigue, dehydration, nausea +vomiting are concerns  -additionally delivered first daughter in [place] at term, had c/s after Jennifer had been experiencing jaundice for 4 mo with worsening condition  -no vaginal bleeding, dysuria, constipation    O:/76 (BP Location: Left arm, Patient Position: Sitting, Cuff Size: Adult Regular)   Pulse 83   Wt 72.8 kg (160 lb 8 oz)   LMP 2024   SpO2 100%   BMI 27.74 kg/m    Past Medical History:   Diagnosis Date    Jaundice, non-     experienced during last 4 mo of first pregnancy     Past Surgical History:   Procedure Laterality Date     SECTION      in [place] due to maternal jaundice       Current Outpatient Medications   Medication Sig Dispense Refill    doxylamine (UNISOM) 25 MG TABS tablet Take 0.5 tablets (12.5 mg) by mouth at bedtime 90 tablet 1    Prenatal Vit-DSS-Fe Cbn-FA (PRENATAL AD PO)       Prenatal Vit-Fe Fumarate-FA (PRENATAL MULTIVITAMIN W/IRON) 27-0.8 MG tablet Take 1 tablet by mouth daily 90 tablet 3    pyridOXINE (VITAMIN B6) 25 MG tablet Take 25 mg by mouth 3 times daily       No current facility-administered medications for this visit.   Alert very pleasant female NAD  RRR  No visual jaundice of skin or eyes  Bp normal      Allergies   Allergen Reactions    Penicillin G      A/P:  Jennifer is a  at 11.2 ega by lmp 24 with brad 10/20/24  -11.4 wk US cw lmp brad remains 10/20/24  -prelim normal US, formal read to follow  -take prenatal daily  -diet mods for nausea  -b6/unisom recc   -prefers to complete NOB labs today  -prefers pnc/delivery with Dr. Ozuna, educated how to schedule  -would like TOLAC consultation    1. Encounter for supervision of normal first pregnancy in first trimester  - Prenatal Vit-Fe Fumarate-FA (PRENATAL MULTIVITAMIN W/IRON) 27-0.8 MG tablet; Take 1 tablet by mouth daily  Dispense: 90  tablet; Refill: 3  - doxylamine (UNISOM) 25 MG TABS tablet; Take 0.5 tablets (12.5 mg) by mouth at bedtime  Dispense: 90 tablet; Refill: 1  - ABO/Rh type and screen  - Hepatitis B surface antigen  - CBC with platelets  - HIV Antigen Antibody Combo  - Rubella Antibody IgG  - Treponema Abs w Reflex to RPR and Titer  - Urine Culture Aerobic Bacterial  - UA without Microscopic - lab collect  - Hepatitis C antibody  - Hepatic panel (Albumin, ALT, AST, Bili, Alk Phos, TP)    2. Family history of thyroid disease in mother  - TSH with free T4 reflex    Rtc for NOB with Dr. Sulma Aguiar, ALEXY CNP

## 2024-04-03 LAB — BACTERIA UR CULT: NO GROWTH

## 2024-04-04 DIAGNOSIS — O99.011 ANEMIA DURING PREGNANCY IN FIRST TRIMESTER: Primary | ICD-10-CM

## 2024-04-04 RX ORDER — MULTIVIT WITH MINERALS/LUTEIN
250 TABLET ORAL DAILY
Qty: 90 TABLET | Refills: 1 | Status: SHIPPED | OUTPATIENT
Start: 2024-04-04

## 2024-04-04 RX ORDER — CYANOCOBALAMIN (VITAMIN B-12) 500 MCG
1000 TABLET ORAL DAILY
Qty: 180 TABLET | Refills: 1 | Status: SHIPPED | OUTPATIENT
Start: 2024-04-04

## 2024-04-19 ENCOUNTER — PRENATAL OFFICE VISIT (OUTPATIENT)
Dept: OBGYN | Facility: CLINIC | Age: 34
End: 2024-04-19
Payer: COMMERCIAL

## 2024-04-19 VITALS
HEART RATE: 95 BPM | BODY MASS INDEX: 27.49 KG/M2 | SYSTOLIC BLOOD PRESSURE: 109 MMHG | DIASTOLIC BLOOD PRESSURE: 69 MMHG | WEIGHT: 161 LBS | HEIGHT: 64 IN | OXYGEN SATURATION: 99 %

## 2024-04-19 DIAGNOSIS — Z34.02 ENCOUNTER FOR SUPERVISION OF NORMAL FIRST PREGNANCY IN SECOND TRIMESTER: Primary | ICD-10-CM

## 2024-04-19 DIAGNOSIS — Z98.891 HISTORY OF C-SECTION: ICD-10-CM

## 2024-04-19 DIAGNOSIS — Z11.3 SCREEN FOR STD (SEXUALLY TRANSMITTED DISEASE): ICD-10-CM

## 2024-04-19 PROCEDURE — 99213 OFFICE O/P EST LOW 20 MIN: CPT | Performed by: OBSTETRICS & GYNECOLOGY

## 2024-04-19 PROCEDURE — 87491 CHLMYD TRACH DNA AMP PROBE: CPT | Performed by: OBSTETRICS & GYNECOLOGY

## 2024-04-19 PROCEDURE — 87591 N.GONORRHOEAE DNA AMP PROB: CPT | Performed by: OBSTETRICS & GYNECOLOGY

## 2024-04-19 NOTE — PROGRESS NOTES
[facility]- OBGYN    Estimated Date of Delivery: Oct 20, 2024  SUBJECTIVE:     HPI:    Jennifer Felipe is a 34 year old  at 13w5d by LMP c/w 11w4d us who presents today for her first OB visit.  Patient reports she has been having acid reflux.  She has not taken anything for it.  She also had nausea and vomiting x2 during pregnancy.  This overall has improved recently.  She denies any issues with having bowel movements, dysuria, or vaginal concerns    OBGYN Hx:   OB History    Para Term  AB Living   2 1 1 0 0 1   SAB IAB Ectopic Multiple Live Births   0 0 0 0 1      # Outcome Date GA Lbr Sai/2nd Weight Sex Type Anes PTL Lv   2 Current            1 Term 10/02/20 39w0d  2.722 kg (6 lb) F -SEC   KAREN       Patient's last menstrual period was 2024.  Menses:regular    STD Hx:none  Pap hx:has never had a pap.  Has never had a speculum exam.  After discussion patient would prefer to have this done postpartum    PMH:   Past Medical History:   Diagnosis Date    Cholestasis during pregnancy        PSH:  Past Surgical History:   Procedure Laterality Date     SECTION      in [facility] due to maternal jaundice       Meds:  Current Outpatient Medications   Medication Sig Dispense Refill    cyanocobalamin (VITAMIN B-12) 500 MCG tablet Take 2 tablets (1,000 mcg) by mouth daily 180 tablet 1    doxylamine (UNISOM) 25 MG TABS tablet Take 0.5 tablets (12.5 mg) by mouth at bedtime 90 tablet 1    Iron, Ferrous Sulfate, 325 (65 Fe) MG TABS Take 1 tablet by mouth every other day 45 tablet 1    Prenatal Vit-DSS-Fe Cbn-FA (PRENATAL AD PO)       Prenatal Vit-Fe Fumarate-FA (PRENATAL MULTIVITAMIN W/IRON) 27-0.8 MG tablet Take 1 tablet by mouth daily 90 tablet 3    pyridOXINE (VITAMIN B6) 25 MG tablet Take 25 mg by mouth 3 times daily      vitamin C (ASCORBIC ACID) 250 MG tablet Take 1 tablet (250 mg) by mouth daily 90 tablet 1     No current facility-administered medications for this visit.  "      Allergies:  Allergies   Allergen Reactions    Penicillin G        SH:Una any tobacco, alcohol, or drug use  FH:I have reviewed this patient's family history and updated it with pertinent information if needed.  Family History   Problem Relation Age of Onset    Hypertension Mother     Thyroid Disease Mother     No Known Problems Father     No Known Problems Brother     No Known Problems Brother     No Known Problems Brother     No Known Problems Daughter        OBJECTIVE:     O: Patient Vitals for the past 24 hrs:   BP Pulse SpO2 Height Weight   24 1522 109/69 95 99 % 1.626 m (5' 4\") 73 kg (161 lb)   ]  Exam:  GENERAL: alert and no distress  EYES: Eyes grossly normal to inspection,  sclerae normal  NECK: no adenopathy, no asymmetry, masses, or scars  RESP: lungs clear to auscultation   CV: regular rate and rhythm  ABDOMEN: soft, nontender  MS: no gross musculoskeletal defects noted, no edema  SKIN: no suspicious lesions or rashes  NEURO: Normal strength and tone, mentation intact and speech normal  PSYCH: mentation appears normal, affect normal/bright    Doptones- 158 bpm    ASSESSMENT/PLAN:     Jennifer Felipe is a 34 year old  at 13w5d by LMP c/w 11w4d us who presents today for her first OB visit.     (Z34.02) Encounter for supervision of normal first pregnancy in second trimester  (primary encounter diagnosis)  Comment: 1st OB labs previously done.  Did not complete GC/CT.  This will be done today.  Explained [facility] for delivery location, clinic visit schedule, call schedule, and team structure including participation of medical students and residents in hospital care.  Following patient's visit it was noted she has an allergy to PCN.  Plan to discuss allergy testing at next visit with referral to allergist at that time.  Also reviewed with patient plan to recheck CBC at next visit since she is taking oral supplementation for anemia.    Plan:   Discussed use of tums for acid reflux. "   Explained plan for anatomy scan between 18-22 weeks of pregnancy.  US OB > 14 Weeks        Next visit scheduled for 24 with Dr. Ozuna.     (Z11.3) Screen for STD (sexually transmitted disease)  Comment: routine screen 1st ob  Plan: NEISSERIA GONORRHOEA PCR, CHLAMYDIA TRACHOMATIS        PCR    (Z98.891) History of   Comment: Patient with history of .  Per report she had itchy palms and soles in pregnancy.  Was told bile acids were high and was given medication for this.  She was recommended to have induction and she states during induction her cervix was not dilating and the baby's heart rate was of concern.  She then had her .  Her and her  are wondering about repeat  vs. TOLAC.  I explained risks of TOLAC include uterine rupture although that risk is overall low with one prior low transverse .  Also explained limitations when it comes to induction and cannot give certain medications for induction (misoprostol and cervidil) due to history of .  Many handouts added to AVS for patient's reference.    Plan: Will continue to discuss delivery route throughout pregnancy    Natalie Truong MD

## 2024-04-20 LAB
C TRACH DNA SPEC QL NAA+PROBE: NEGATIVE
N GONORRHOEA DNA SPEC QL NAA+PROBE: NEGATIVE

## 2024-05-13 ENCOUNTER — PRENATAL OFFICE VISIT (OUTPATIENT)
Dept: OBGYN | Facility: CLINIC | Age: 34
End: 2024-05-13
Payer: COMMERCIAL

## 2024-05-13 VITALS
BODY MASS INDEX: 27.7 KG/M2 | TEMPERATURE: 98.2 F | OXYGEN SATURATION: 98 % | SYSTOLIC BLOOD PRESSURE: 100 MMHG | HEART RATE: 99 BPM | DIASTOLIC BLOOD PRESSURE: 68 MMHG | WEIGHT: 161.4 LBS

## 2024-05-13 DIAGNOSIS — Z34.80 SUPERVISION OF OTHER NORMAL PREGNANCY, ANTEPARTUM: Primary | ICD-10-CM

## 2024-05-13 PROCEDURE — 99207 PR PRENATAL VISIT: CPT | Performed by: OBSTETRICS & GYNECOLOGY

## 2024-05-13 NOTE — PATIENT INSTRUCTIONS
"Weeks 14 to 18 of Your Pregnancy: Care Instructions  Around this time, you may start to look pregnant. Your baby is now able to pass urine. And the first stool (meconium) is starting to collect in your baby's intestines. Hair is starting to grow on your baby's head.    You may notice some skin changes, such as itchy spots on your palms or acne on your face.    At your next doctor visit, you may have an ultrasound. So you might think about whether you want to know the sex of your baby. Also ask your doctor about flu and COVID-19 shots.     How to reduce stress   Ask for help when you need it.  Try to avoid things that cause you stress.  Seek out things that relieve stress, such as breathing exercises or yoga.     How to get exercise   If you don't usually exercise, start slowly. Short walks may be a good choice.  Try to be active 30 minutes a day, at least 5 days a week.  Avoid activities where you're more likely to fall.  Use light weights to reduce stress on your joints.     How to stay at a healthy weight for you   Talk to your doctor or midwife about how much weight you should gain.  It's generally best to gain:  About 28 to 40 pounds if you're underweight.  About 25 to 35 pounds if you're at a healthy weight.  About 15 to 25 pounds if you're overweight.  About 11 to 20 pounds if you're very overweight (obese).  Follow-up care is a key part of your treatment and safety. Be sure to make and go to all appointments, and call your doctor if you are having problems. It's also a good idea to know your test results and keep a list of the medicines you take.  Where can you learn more?  Go to https://www.[employer].net/patiented  Enter I453 in the search box to learn more about \"Weeks 14 to 18 of Your Pregnancy: Care Instructions.\"  Current as of: July 10, 2023               Content Version: 14.0    9076-8057 [employer].   Care instructions adapted under license by your healthcare professional. If you have " questions about a medical condition or this instruction, always ask your healthcare professional. Healthwise, [facility] disclaims any warranty or liability for your use of this information.      Second-Trimester Fetal Ultrasound: About This Test  What is it?     Fetal ultrasound is a test that uses sound waves to make pictures of your baby (fetus) and placenta inside the uterus. The test is the safest way to find out the age, size, and position of your baby. You also may be able to find out the sex of your baby. (But the test isn't done just to find out a baby's sex.)  No known risks to the mother or the baby are linked to fetal ultrasound. But you may feel anxious if the test reveals a problem with your pregnancy or baby.  Why is this test done?  In the second trimester, a fetal ultrasound is done to:  Estimate the number of weeks and days a fetus has developed since the beginning of the pregnancy. This is called the gestational age.  Look at the size and position of the fetus, the placenta, and the fluid that surrounds the fetus.  Find major birth defects, such as heart problems or problems with the brain and spinal cord (neural tube defects). But the test may not be able to find many minor defects and some major birth defects.  How do you prepare for the test?  In general, there's nothing you have to do before this test, unless your doctor tells you to.  How is the test done?  You may be able to leave your clothes on, or you will be given a gown to wear.  You will lie on your back on a padded examination table.  A gel will be spread on your belly. It will be removed after the test.  A small, handheld device called a transducer will be pressed against the gel on your skin and moved across your belly several times.  You may watch the monitor to see the picture of your baby during the test.  What happens after the test?  You will probably be able to go home right away.  You most likely will be able to go back to  "your usual activities right away.  Follow-up care is a key part of your treatment and safety. Be sure to make and go to all appointments, and call your doctor if you are having problems. It's also a good idea to keep a list of the medicines you take. Ask your doctor when you can expect to have your test results.  Where can you learn more?  Go to https://www.[email or web address].com/patiented  Enter Y671 in the search box to learn more about \"Second-Trimester Fetal Ultrasound: About This Test.\"  Current as of: July 10, 2023               Content Version: 14.0    0389-4611 [employer].   Care instructions adapted under license by your healthcare professional. If you have questions about a medical condition or this instruction, always ask your healthcare professional. [employer] disclaims any warranty or liability for your use of this information.      During Pregnancy: Exercises  Introduction  Here are some examples of exercises to do during your pregnancy. Start each exercise slowly. Ease off the exercise if you start to have pain.  Talk to your doctor about when you can start these exercises and which ones will work best for you.  How to do the exercises  Neck rotation    Sit up straight in a firm chair, or stand up straight. If you're standing, keep your feet about hip-width apart.  Keeping your chin level, turn your head to the right and hold for 15 to 30 seconds.  Turn your head to the left and hold for 15 to 30 seconds.  Repeat 2 to 4 times.  Neck stretch to the front    Sit up straight in a firm chair, or stand up straight. Look straight ahead. If you're standing, keep your feet about hip-width apart.  Slowly bend your head forward without moving your shoulders.  Hold for 15 to 30 seconds, then return to your starting position.  Repeat 2 to 4 times.  Back press    Stand with your back 10 to 12 inches away from a wall.  Lean into the wall until your back is against it. Press your lower back " against the wall by pulling in your stomach muscles.  Slowly slide down until your knees are slightly bent, pressing your lower back against the wall.  Hold for at least 6 seconds, then slide back up the wall.  Repeat 8 to 12 times.  Over time, work up to holding this position for as much as 1 minute.  Trunk twist    Sit on the floor with your legs crossed. If that's not comfortable, you can sit on a folded blanket so your bottom is a few inches off the floor. Or you can sit on a chair with your knees hip-width apart and your feet flat on the floor.  Reach your left hand toward your right knee. You can place your right hand at your side for support.  Slowly twist your body (trunk) to your right.  Relax and return to your starting position.  Repeat 2 to 4 times.  Switch your hands and twist to your left.  Repeat 2 to 4 times.  Pelvic rocking on hands and knees    Start on your hands and knees. Place your wrists directly below your shoulders and your knees below your hips.  Breathe in slowly. Tuck your head downward and round your back up, making a curve with your back in the shape of the letter C. Hold this position for about 6 seconds.  Breathe out slowly and bring your head back up. Relax, keeping your back straight. (Don't allow it to curve toward the floor.) Hold for about 6 seconds.  Repeat 8 to 12 times, gently rocking your pelvis.  Pelvic tilt    Lie on your back with your knees bent and your feet flat on the floor.  Tighten your belly muscles by pulling your belly button in toward your spine. Press your lower back to the floor. You should feel your hips and pelvis rock back.  Hold for 6 seconds while breathing smoothly, and then relax.  Repeat 8 to 12 times.  Do this exercise only during the first 4 months of pregnancy. After this point, lying on your back is not recommended, because it can cause blood flow problems for you and your baby.  Backward stretch    Start on your hands and knees with your knees 8 to  10 inches apart, hands directly below your shoulders, and arms and back straight.  Keeping your arms straight, slowly lower your buttocks toward your heels and tuck your head toward your knees. Hold for 15 to 30 seconds.  Slowly return to the starting position.  Repeat 2 to 4 times.  Forward bend    Sit comfortably in a chair, with your arms relaxed.  Slowly bend forward, allowing your arms to hang down. Lean only as far as you can without feeling discomfort or pressure on your belly.  Hold for 15 to 30 seconds and then slowly sit up straight.  Repeat 2 to 4 times or to your comfort level.  Donkey kick    Start on your hands and knees. Place your hands directly below your shoulders, and keep your arms straight.  Tighten your belly muscles by pulling your belly button in toward your spine. Keep breathing normally, and don't hold your breath.  Lift one knee and bring it toward your elbow.  Slowly extend that leg behind you without completely straightening it. Be careful not to let your hip drop down. Avoid arching your back.  Hold your leg behind you for about 6 seconds.  Return to your starting position.  Repeat 8 to 12 times for each leg.  Tailor sitting    Sit on the floor.  Bring your feet close to your body while crossing your ankles.  Keep your back straight. Relax your legs and let your knees drop toward the floor.  Hold this position for as long as you are comfortable.  Toe reach    Sit on the floor with your back straight, legs about 12 inches apart, and feet relaxed outward.  Stretch your hands forward toward your right foot, then sit up.  Stretch your hands straight forward, then sit up.  Stretch your hands forward toward your left foot, then sit up.  Hold each stretch for 15 to 30 seconds.  Repeat 2 to 4 times.  Follow-up care is a key part of your treatment and safety. Be sure to make and go to all appointments, and call your doctor if you are having problems. It's also a good idea to know your test  results and keep a list of the medicines you take.  Current as of: July 10, 2023               Content Version: 14.0    7231-9378 [employer].   Care instructions adapted under license by your healthcare professional. If you have questions about a medical condition or this instruction, always ask your healthcare professional. [employer] disclaims any warranty or liability for your use of this information.

## 2024-05-13 NOTE — PROGRESS NOTES
17w1d   Feeling well.  Still struggles with bloating and gas. No nausea. Not feeling movement yet.   Needs to get the FAS scheduled.   Has questions about rotating call schedule, route of delivery.   First delivery in [place] was c/b elevated bile acids.  She was incuced at 39 wks.  Fetal tachycardia noted and had a  section for fetal indication. Healed with no problems.  She is not decided on route of delivery but asking if they can schedule a  section ~ 39 wks and then if goes into labor might consider TOLAC.    They are planning a week trip to [facility] for 's conference.  We discussed travel precautions.  Will take a LDA. RR

## 2024-05-27 DIAGNOSIS — Z34.80 SUPERVISION OF OTHER NORMAL PREGNANCY, ANTEPARTUM: Primary | ICD-10-CM

## 2024-05-27 DIAGNOSIS — Z98.891 HISTORY OF C-SECTION: ICD-10-CM

## 2024-06-03 ENCOUNTER — ANCILLARY PROCEDURE (OUTPATIENT)
Dept: ULTRASOUND IMAGING | Facility: CLINIC | Age: 34
End: 2024-06-03
Attending: OBSTETRICS & GYNECOLOGY
Payer: COMMERCIAL

## 2024-06-03 ENCOUNTER — PRENATAL OFFICE VISIT (OUTPATIENT)
Dept: OBGYN | Facility: CLINIC | Age: 34
End: 2024-06-03
Attending: OBSTETRICS & GYNECOLOGY
Payer: COMMERCIAL

## 2024-06-03 VITALS
DIASTOLIC BLOOD PRESSURE: 71 MMHG | OXYGEN SATURATION: 99 % | SYSTOLIC BLOOD PRESSURE: 107 MMHG | TEMPERATURE: 98.1 F | WEIGHT: 165.1 LBS | BODY MASS INDEX: 28.34 KG/M2 | HEART RATE: 89 BPM

## 2024-06-03 DIAGNOSIS — Z34.80 SUPERVISION OF OTHER NORMAL PREGNANCY, ANTEPARTUM: ICD-10-CM

## 2024-06-03 DIAGNOSIS — Z34.02 ENCOUNTER FOR SUPERVISION OF NORMAL FIRST PREGNANCY IN SECOND TRIMESTER: Primary | ICD-10-CM

## 2024-06-03 PROCEDURE — 76805 OB US >/= 14 WKS SNGL FETUS: CPT | Performed by: OBSTETRICS & GYNECOLOGY

## 2024-06-03 PROCEDURE — 99207 PR PRENATAL VISIT: CPT | Performed by: OBSTETRICS & GYNECOLOGY

## 2024-06-03 NOTE — PROGRESS NOTES
20w1d   Patient complains of URI which is improving but still struggling with dry cough.  No current fever. Has started to feel FM. Had FAS done today, final report not available yet.   discussed GCT at [place].  RR

## 2024-06-03 NOTE — PATIENT INSTRUCTIONS

## 2024-06-05 DIAGNOSIS — Z34.02 ENCOUNTER FOR SUPERVISION OF NORMAL FIRST PREGNANCY IN SECOND TRIMESTER: Primary | ICD-10-CM

## 2024-06-05 DIAGNOSIS — Z98.891 HISTORY OF C-SECTION: ICD-10-CM

## 2024-06-06 ENCOUNTER — TRANSCRIBE ORDERS (OUTPATIENT)
Dept: MATERNAL FETAL MEDICINE | Facility: CLINIC | Age: 34
End: 2024-06-06
Payer: COMMERCIAL

## 2024-06-06 DIAGNOSIS — O26.90 PREGNANCY RELATED CONDITION, ANTEPARTUM: Primary | ICD-10-CM

## 2024-06-17 ENCOUNTER — PRE VISIT (OUTPATIENT)
Dept: MATERNAL FETAL MEDICINE | Facility: CLINIC | Age: 34
End: 2024-06-17
Payer: COMMERCIAL

## 2024-06-19 ENCOUNTER — OFFICE VISIT (OUTPATIENT)
Dept: MATERNAL FETAL MEDICINE | Facility: CLINIC | Age: 34
End: 2024-06-19
Attending: STUDENT IN AN ORGANIZED HEALTH CARE EDUCATION/TRAINING PROGRAM
Payer: COMMERCIAL

## 2024-06-19 ENCOUNTER — HOSPITAL ENCOUNTER (OUTPATIENT)
Dept: ULTRASOUND IMAGING | Facility: CLINIC | Age: 34
Discharge: HOME | End: 2024-06-19
Attending: STUDENT IN AN ORGANIZED HEALTH CARE EDUCATION/TRAINING PROGRAM
Payer: COMMERCIAL

## 2024-06-19 DIAGNOSIS — O26.90 PREGNANCY RELATED CONDITION, ANTEPARTUM: ICD-10-CM

## 2024-06-19 DIAGNOSIS — O26.90 PREGNANCY RELATED CONDITION, ANTEPARTUM: Primary | ICD-10-CM

## 2024-06-19 PROCEDURE — 99203 OFFICE O/P NEW LOW 30 MIN: CPT | Mod: 25 | Performed by: STUDENT IN AN ORGANIZED HEALTH CARE EDUCATION/TRAINING PROGRAM

## 2024-06-19 PROCEDURE — 76811 OB US DETAILED SNGL FETUS: CPT

## 2024-06-19 PROCEDURE — 76811 OB US DETAILED SNGL FETUS: CPT | Mod: 26 | Performed by: STUDENT IN AN ORGANIZED HEALTH CARE EDUCATION/TRAINING PROGRAM

## 2024-06-19 NOTE — NURSING NOTE
Patient reports positive fetal movement, denies pain, denies contractions, leaking of fluid, or bleeding. Education provided to patient on US. SBAR given to BENI MCKNIGHT, see their note in Epic.

## 2024-07-02 ENCOUNTER — PRENATAL OFFICE VISIT (OUTPATIENT)
Dept: OBGYN | Facility: CLINIC | Age: 34
End: 2024-07-02
Payer: COMMERCIAL

## 2024-07-02 VITALS
DIASTOLIC BLOOD PRESSURE: 71 MMHG | SYSTOLIC BLOOD PRESSURE: 114 MMHG | BODY MASS INDEX: 28.67 KG/M2 | WEIGHT: 167 LBS | TEMPERATURE: 97.7 F | HEART RATE: 88 BPM

## 2024-07-02 DIAGNOSIS — Z34.02 ENCOUNTER FOR SUPERVISION OF NORMAL FIRST PREGNANCY IN SECOND TRIMESTER: Primary | ICD-10-CM

## 2024-07-02 DIAGNOSIS — Z88.0 PERSONAL HISTORY OF ALLERGY TO PENICILLIN: ICD-10-CM

## 2024-07-02 DIAGNOSIS — Z98.891 HISTORY OF C-SECTION: ICD-10-CM

## 2024-07-02 DIAGNOSIS — L29.9 PRURITIC DISORDER: ICD-10-CM

## 2024-07-02 LAB
ALBUMIN SERPL BCG-MCNC: 3.3 G/DL (ref 3.5–5.2)
ALP SERPL-CCNC: 269 U/L (ref 40–150)
ALT SERPL W P-5'-P-CCNC: 56 U/L (ref 0–50)
AST SERPL W P-5'-P-CCNC: 51 U/L (ref 0–45)
BILIRUB DIRECT SERPL-MCNC: 0.61 MG/DL (ref 0–0.3)
BILIRUB SERPL-MCNC: 0.9 MG/DL
FERRITIN SERPL-MCNC: 12 NG/ML (ref 6–175)
GLUCOSE 1H P 50 G GLC PO SERPL-MCNC: 125 MG/DL (ref 70–129)
HGB BLD-MCNC: 10.5 G/DL (ref 11.7–15.7)
PROT SERPL-MCNC: 6.9 G/DL (ref 6.4–8.3)
TSH SERPL DL<=0.005 MIU/L-ACNC: 2 UIU/ML (ref 0.3–4.2)
VIT B12 SERPL-MCNC: 191 PG/ML (ref 232–1245)
VIT D+METAB SERPL-MCNC: 21 NG/ML (ref 20–50)

## 2024-07-02 PROCEDURE — 82950 GLUCOSE TEST: CPT | Performed by: OBSTETRICS & GYNECOLOGY

## 2024-07-02 PROCEDURE — 82239 BILE ACIDS TOTAL: CPT | Mod: 90 | Performed by: OBSTETRICS & GYNECOLOGY

## 2024-07-02 PROCEDURE — 99213 OFFICE O/P EST LOW 20 MIN: CPT | Mod: 25 | Performed by: OBSTETRICS & GYNECOLOGY

## 2024-07-02 PROCEDURE — 99207 PR PRENATAL VISIT: CPT | Performed by: OBSTETRICS & GYNECOLOGY

## 2024-07-02 PROCEDURE — 36415 COLL VENOUS BLD VENIPUNCTURE: CPT | Performed by: OBSTETRICS & GYNECOLOGY

## 2024-07-02 PROCEDURE — 99000 SPECIMEN HANDLING OFFICE-LAB: CPT | Performed by: OBSTETRICS & GYNECOLOGY

## 2024-07-02 PROCEDURE — 82728 ASSAY OF FERRITIN: CPT | Performed by: OBSTETRICS & GYNECOLOGY

## 2024-07-02 PROCEDURE — 84443 ASSAY THYROID STIM HORMONE: CPT | Performed by: OBSTETRICS & GYNECOLOGY

## 2024-07-02 PROCEDURE — 82607 VITAMIN B-12: CPT | Performed by: OBSTETRICS & GYNECOLOGY

## 2024-07-02 PROCEDURE — 82306 VITAMIN D 25 HYDROXY: CPT | Performed by: OBSTETRICS & GYNECOLOGY

## 2024-07-02 PROCEDURE — 80076 HEPATIC FUNCTION PANEL: CPT | Performed by: OBSTETRICS & GYNECOLOGY

## 2024-07-02 RX ORDER — HYDROXYZINE HYDROCHLORIDE 25 MG/1
25 TABLET, FILM COATED ORAL 3 TIMES DAILY PRN
Qty: 40 TABLET | Refills: 1 | Status: SHIPPED | OUTPATIENT
Start: 2024-07-02

## 2024-07-02 RX ORDER — CETIRIZINE HYDROCHLORIDE 10 MG/1
10 TABLET ORAL DAILY
Qty: 30 TABLET | Refills: 1 | Status: SHIPPED | OUTPATIENT
Start: 2024-07-02 | End: 2024-07-09

## 2024-07-02 NOTE — PROGRESS NOTES
24w2d  complains of itching and rash all over her body including extremities and torso, palms and soles.  The symptoms started a few months ago, but more significant for the past 2 wks.  Wakes up itching at night especially.  Palms are in particular itchy.  She had this same thing in the 3rd tri last pregnancy.  Started sooner with this pregnancy.   Reviewed diet and weight gain.   She is not a vegetarian but does not eat much meat.  Will check B12 and iron today.      Planning to start prenatal yoga.   Reviewed MFM US from 24 showed thin TOO at 1.8 cm.  Patient has planned on repeat  section.  Will follow-up in 4-6 wks per MF recommendation.   OBJECTIVE:   Patient with tiny papular rash over arms and abd.  Worse with erythema on bilateral upper extremity.     (Z34.02) Encounter for supervision of normal first pregnancy in second trimester  (primary encounter diagnosis)  Comment:    Plan: OB hemoglobin, Glucose tolerance gest screen 1         hour, Ferritin, TSH, Vitamin D Deficiency,         Vitamin B12, [facility] Referral -         Pregnancy           (L29.9) Pruritic disorder  Comment: discussed possible ICP vs immune reaction to pregnancy  Plan: Bile acids total, Hepatic panel (Albumin, ALT,         AST, Bili, Alk Phos, TP), hydrOXYzine HCl         (ATARAX) 25 MG tablet, cetirizine (ZYRTEC) 10         MG tablet          (Z88.0) Personal history of allergy to penicillin  Comment:    Plan: Adult Allergy/Asthma  Referral         Dr Borja     (Z98.891) History of   Comment:  thin TOO on MFM US -- .discussed findings.   They are already planning repeat  section.   Plan: [facility] Referral - Pregnancy        Continue to monitor with US per [facility] rec.     Audelia Ozuna MD

## 2024-07-03 ENCOUNTER — TRANSCRIBE ORDERS (OUTPATIENT)
Dept: MATERNAL FETAL MEDICINE | Facility: CLINIC | Age: 34
End: 2024-07-03
Payer: COMMERCIAL

## 2024-07-03 DIAGNOSIS — O26.90 PREGNANCY RELATED CONDITION, ANTEPARTUM: Primary | ICD-10-CM

## 2024-07-03 DIAGNOSIS — E53.8 VITAMIN B12 DEFICIENCY (NON ANEMIC): Primary | ICD-10-CM

## 2024-07-03 RX ORDER — LANOLIN ALCOHOL/MO/W.PET/CERES
1000 CREAM (GRAM) TOPICAL DAILY
Qty: 100 TABLET | Refills: 3 | Status: SHIPPED | OUTPATIENT
Start: 2024-07-03

## 2024-07-05 ENCOUNTER — MYC MEDICAL ADVICE (OUTPATIENT)
Dept: OBGYN | Facility: CLINIC | Age: 34
End: 2024-07-05
Payer: COMMERCIAL

## 2024-07-05 DIAGNOSIS — O26.642 CHOLESTASIS DURING PREGNANCY IN SECOND TRIMESTER: Primary | ICD-10-CM

## 2024-07-05 LAB — BILE AC SERPL-SCNC: 339 UMOL/L

## 2024-07-05 RX ORDER — URSODIOL 300 MG/1
300 CAPSULE ORAL 3 TIMES DAILY
Qty: 270 CAPSULE | Refills: 2 | Status: SHIPPED | OUTPATIENT
Start: 2024-07-05 | End: 2025-01-21

## 2024-07-05 NOTE — TELEPHONE ENCOUNTER
Pt last seen 2024 for prenatal, currently 24w5d, - repeat  scheduled on 10/14    Pt has had concerns for papular rash covering arms and abdomen, allergy to penicillin- referral placed to Dr. Borja but pt not able to get in until 2025- asking for recommendations in the meantime.    RN routing to provider to advise.    Flor Dillard RN on 2024 at 3:00 PM

## 2024-07-08 DIAGNOSIS — O26.649 CHOLESTASIS DURING PREGNANCY: Primary | ICD-10-CM

## 2024-07-09 ENCOUNTER — PRENATAL OFFICE VISIT (OUTPATIENT)
Dept: OBGYN | Facility: CLINIC | Age: 34
End: 2024-07-09
Payer: COMMERCIAL

## 2024-07-09 VITALS
BODY MASS INDEX: 28.15 KG/M2 | SYSTOLIC BLOOD PRESSURE: 112 MMHG | DIASTOLIC BLOOD PRESSURE: 67 MMHG | HEART RATE: 103 BPM | TEMPERATURE: 97.7 F | WEIGHT: 164 LBS

## 2024-07-09 DIAGNOSIS — O09.90 HIGH-RISK PREGNANCY, UNSPECIFIED TRIMESTER: Primary | ICD-10-CM

## 2024-07-09 DIAGNOSIS — O26.642 CHOLESTASIS DURING PREGNANCY IN SECOND TRIMESTER: ICD-10-CM

## 2024-07-09 PROCEDURE — 99207 PR PRENATAL VISIT: CPT | Performed by: OBSTETRICS & GYNECOLOGY

## 2024-07-09 NOTE — PROGRESS NOTES
25w2d   Bile acids came back at 339 last week.   Feeling so much better after starting ursodiol. The itching is 80% better. Vistaril is helping as well.   Starting to sleep better after taking meds.  discussed repeating bile acids, but has only been on ursodiol for 3 days. Plan to repeat labs after a week, to assess improvement. She has an MFM consult later this week. Baby is active.  discussed ICP and possible need for early delivery if bile acids remain elevated.  RR

## 2024-07-09 NOTE — Clinical Note
This patient will be delivering in September and her appt with Dr Borja is not until February next yr. Can you please call his office and see if they will move up her appt urgently.  She may need to deliver at 36 wks due to ICP.  Thanks RR

## 2024-07-10 ENCOUNTER — TELEPHONE (OUTPATIENT)
Dept: OBGYN | Facility: CLINIC | Age: 34
End: 2024-07-10
Payer: COMMERCIAL

## 2024-07-10 DIAGNOSIS — Z01.818 PRE-OP EXAM: Primary | ICD-10-CM

## 2024-07-10 DIAGNOSIS — O26.642 CHOLESTASIS DURING PREGNANCY IN SECOND TRIMESTER: ICD-10-CM

## 2024-07-10 NOTE — TELEPHONE ENCOUNTER
Called patient to discuss c/s scheduling on 10/14/24. Currently all c/s times available and patient would like to discuss with  before choosing a time. Patient will call back and has direct call back number. Patient aware that once a time has been scheduled for a patient, that time is no longer available for other patients to choose from.     Joana  She/her/hers  [facility]

## 2024-07-10 NOTE — TELEPHONE ENCOUNTER
Type of surgery: ob  Location of surgery: [facility] OR  Date and time of surgery: 10/14/24 12:30PM  Surgeon: Katey Ozuna assisting  Pre-Op Appt Date: surgeon  Post-Op Appt Date: provider schedules not that far out yet   Packet sent out: Yes  Pre-cert/Authorization completed:  Not Applicable  Date: 07/10/24     KEYLA Bernard  She/her/hers  [facility]

## 2024-07-11 ENCOUNTER — OFFICE VISIT (OUTPATIENT)
Dept: MATERNAL FETAL MEDICINE | Facility: HOSPITAL | Age: 34
End: 2024-07-11
Attending: STUDENT IN AN ORGANIZED HEALTH CARE EDUCATION/TRAINING PROGRAM
Payer: COMMERCIAL

## 2024-07-11 ENCOUNTER — ANCILLARY PROCEDURE (OUTPATIENT)
Dept: ULTRASOUND IMAGING | Facility: HOSPITAL | Age: 34
End: 2024-07-11
Attending: STUDENT IN AN ORGANIZED HEALTH CARE EDUCATION/TRAINING PROGRAM
Payer: COMMERCIAL

## 2024-07-11 DIAGNOSIS — O26.90 PREGNANCY RELATED CONDITION, ANTEPARTUM: ICD-10-CM

## 2024-07-11 DIAGNOSIS — O26.90 PREGNANCY RELATED CONDITION, ANTEPARTUM: Primary | ICD-10-CM

## 2024-07-11 DIAGNOSIS — O26.642 CHOLESTASIS DURING PREGNANCY IN SECOND TRIMESTER: ICD-10-CM

## 2024-07-11 PROCEDURE — 76816 OB US FOLLOW-UP PER FETUS: CPT | Mod: 26 | Performed by: STUDENT IN AN ORGANIZED HEALTH CARE EDUCATION/TRAINING PROGRAM

## 2024-07-11 PROCEDURE — 76816 OB US FOLLOW-UP PER FETUS: CPT

## 2024-07-11 PROCEDURE — 99212 OFFICE O/P EST SF 10 MIN: CPT | Performed by: STUDENT IN AN ORGANIZED HEALTH CARE EDUCATION/TRAINING PROGRAM

## 2024-07-11 PROCEDURE — 99215 OFFICE O/P EST HI 40 MIN: CPT | Mod: 25 | Performed by: STUDENT IN AN ORGANIZED HEALTH CARE EDUCATION/TRAINING PROGRAM

## 2024-07-11 NOTE — PROGRESS NOTES
Maternal-Fetal Medicine Consultation    Jennifer Felipe  : 1990  MRN: [id number]    REFERRAL:  Jennifer Felipe is a 34 year old sent by Dr. Ozuna from  OBGYN clinic for MFM consultation.    HPI:  Jennifer Felipe is a 34 year old  at 25w4d by LMP consistent with 11w4d US here for MFM consultation regarding intrahepatic cholestasis of pregnancy.    Jennifer has had one prior pregnancy.  She reports onset of significant itching of her palms and soles starting in the third month of pregnancy.  She remembers elevated laboratory values, likely bile acids and liver enzymes, which were assessed throughout her pregnancy.  Previous documentation has mentioned jaundice, however,  states this is often a general term used when discussing and liver disease/change and they do not specifically recall changes of her skin or the whites of her eyes to yellow.  She ultimately underwent induction of labor at 39 weeks of gestation. Due to fetal heart rate concerns she had a  section. Symptoms resolved after delivery.    Similarly this pregnancy, she began to note symptoms this pregnancy.  At her 24w2d visit with Dr. Ozuna, she mentioned itching and rash all over her body, including on her extremities, torso, palms, and soles, which had been going on for several months and noted to be acutely worsening in the past two weeks. The itching has woken her from sleep.  Given this, bile acids were obtained and noted to be 339.  Hepatic panel was also notable for mild elevation of transaminates (AST 51, ALT 56).  Given her symptoms, she was started on Ursodiol 300 mg TID, which has decreased her symptoms. Currently, her symptoms have almost completely resolved (down to 10% of the time).      She otherwise feels well and has no other concerns.    Obstetrics History:  OB History    Para Term  AB Living   2 1 1 0 0 1   SAB IAB Ectopic Multiple Live Births   0 0 0 0 1      # Outcome Date GA Lbr Sai/2nd  Weight Sex Type Anes PTL Lv   2 Current            1 Term 10/02/20 39w0d  2.722 kg (6 lb) F -SEC   KAREN       Past Medical History:  Past Medical History:   Diagnosis Date    Cholestasis during pregnancy        Past Surgical History:  Past Surgical History:   Procedure Laterality Date     SECTION      in [place].  Due to fetal distress during induction. pt reports cervix not dilating       Current Medications:  Prior to Admission medications    Medication Sig Last Dose Taking? Auth Provider Long Term End Date   cyanocobalamin (VITAMIN B-12) 1000 MCG tablet Take 1 tablet (1,000 mcg) by mouth daily   Audelia Ozuna MD     cyanocobalamin (VITAMIN B-12) 500 MCG tablet Take 2 tablets (1,000 mcg) by mouth daily   Fior Pastrana APRN CNM     hydrOXYzine HCl (ATARAX) 25 MG tablet Take 1 tablet (25 mg) by mouth 3 times daily as needed for itching   Audelia Ozuna MD     Iron, Ferrous Sulfate, 325 (65 Fe) MG TABS Take 1 tablet by mouth every other day   Fior Pastrana APRN CNM     Prenatal Vit-DSS-Fe Cbn-FA (PRENATAL AD PO)    Reported, Patient     Prenatal Vit-Fe Fumarate-FA (PRENATAL MULTIVITAMIN W/IRON) 27-0.8 MG tablet Take 1 tablet by mouth daily   Piedad Aguiar APRN CNP     ursodiol (ACTIGALL) 300 MG capsule Take 1 capsule (300 mg) by mouth 3 times daily for 200 days   Natalie Truong MD  25   vitamin C (ASCORBIC ACID) 250 MG tablet Take 1 tablet (250 mg) by mouth daily   Fior Pastrana APRN CNM         Allergies:  Penicillin g    Social History:   Social History     Socioeconomic History    Marital status:      Spouse name: Not on file    Number of children: Not on file    Years of education: Not on file    Highest education level: Not on file   Occupational History    Not on file   Tobacco Use    Smoking status: Never    Smokeless tobacco: Never   Vaping Use    Vaping status: Never Used   Substance and Sexual Activity    Alcohol use: Not  "Currently    Drug use: Never    Sexual activity: Yes     Partners: Male   Other Topics Concern    Not on file   Social History Narrative    Not on file     Social Determinants of Health     Financial Resource Strain: Low Risk  (10/4/2023)    Financial Resource Strain     Within the past 12 months, have you or your family members you live with been unable to get utilities (heat, electricity) when it was really needed?: No   Food Insecurity: Low Risk  (10/4/2023)    Food Insecurity     Within the past 12 months, did you worry that your food would run out before you got money to buy more?: No     Within the past 12 months, did the food you bought just not last and you didn t have money to get more?: No   Transportation Needs: Low Risk  (10/4/2023)    Transportation Needs     Within the past 12 months, has lack of transportation kept you from medical appointments, getting your medicines, non-medical meetings or appointments, work, or from getting things that you need?: No   Physical Activity: Not on file   Stress: Not on file   Social Connections: Not on file   Interpersonal Safety: Not on file   Housing Stability: Low Risk  (10/4/2023)    Housing Stability     Do you have housing? : Yes     Are you worried about losing your housing?: No        Family History:  Family History   Problem Relation Age of Onset    Hypertension Mother     Thyroid Disease Mother     No Known Problems Father     No Known Problems Brother     No Known Problems Brother     No Known Problems Brother     No Known Problems Daughter        ROS:  10-point ROS negative except as in HPI     PHYSICAL EXAM:  LMP 2024      Deferred    Imaging:   Please see \"Imaging\" tab under \"Chart Review\" for details of today's US at the [facility].     ASSESSMENT/PLAN:  Jennifer Felipe is a 34 year old  at 25w4d by LMP consistent with 11w4d US here for [facility] consultation regarding intrahepatic cholestasis of pregnancy.    Intrahepatic cholestasis of " pregnancy  - Cholestasis is characterized by pruritus without rash, which can be generalized but worse on the palms and soles and at night.  Pruritus may be seen prior to any laboratory abnormalities.  Physical examination is nonspecific outside of excoriations due to scratching.  Jaundice occurs in less than 10%.  Total bile acids are elevated, especially due to elevated serum cholic acid.  Serum aminotransferases, alkaline phosphatase, and total and direct bilirubin levels may be elevated as well, but are not necessary for diagnosis.    - The largest concern is increased  morbidity and mortality.  There is a risk of  delivery, fetal distress, meconium passage, and  respiratory distress. There is also a risk for stillbirth, which is approximately 1.2% after 37 weeks  gestation, with increasing risk as pregnancy progresses.  Additionally, the highest risk for complication appears to increase as the bile acid level increases and the risk for complication appears to commensurate to the highest  bile acid level.  For that reason we recommend checking bile acids again at 34 weeks.  If peak bile acids levels are <100  mol/L, delivery is recommended at 36w0d - 39w0d. However, if bile acid levels are  ?100  mol/L, then delivery at 36w0d to 36w6d is recommended. If bile acid levels are  ?100  mol/L and the patient has excruciating or unremitting pruritus not relieved with pharmacotherapy, prior history of stillbirth before 36 weeks due to ICP with recurring ICP in the current pregnancy, and/or preexisting or acute hepatic disease with clinical or laboratory evidence of worsening hepatic function, earlier delivery at 34w0d - 36w0d is recommended.  - There is no standard recommendation for  fetal surveillance, but given risk for stillbirth,  surveillance with BPP is recommended.  These should be scheduled weekly from 28-32 weeks of gestation and then twice weekly after 32 weeks until  delivery.  - Maternal prognosis is favorable.  Pruritus usually disappears postpartum and serum bile acid concentrations and liver function tests return to normal.  ICP can recur in subsequent pregnancies.  Affected women may be at increased risk for the development of gallstones.   A subset of women with cholestasis have underlying liver disease, such as hepatitis C, nonalcoholic liver cirrhosis, and progressive fibrosis has also been described.  Thus, women in whom ICP is suspected and/or who have elevated serum aminotransferase during pregnancy should be tested for chronic hepatitis (especially hepatitis C).  - Ursodeoxycholic acid (Ursodiol) 10-15mg/kg divided in BID to TID dosing is recommended in pregnancies complicated by IHCP.  While treatment with Ursodiol has not demonstrated an improvement in fetal and  outcomes, Ursodiol does decrease pruritus and improves laboratory parameters.   Other medications are aimed more at improving maternal symptomatology such as hydroxyzine.        Recommendations:  Daily kick counts and presentation to triage with any concerns for decreased fetal movement  Continue Ursodiol for management of maternal symptoms  Ultrasound surveillance:  Serial growth ultrasounds and lower uterine segment assessment given prior noted thin lower uterine segment   Weekly  surveillance from 28-32 weeks with increase to twice weekly starting at 32 weeks of gestation  Re-evaluate bile acids and LFTs at 32 weeks gestation to guide timing of delivery, with consideration for repeat testing at 36 weeks, if clinically indicated.   Timing of delivery should be based on peak total bile acids:  If peak total bile acids 10 - 99 micromol/L, delivery at 36w0d - 39w0d   If peak total bile acids ? 100 micromol/L, delivery at 36w0d - 36w6d  If peak total bile acids  ? 100 micromol/L and one the following criteria, consider delivery between 34w0d - 36w0d  Excruciating/unremitting pruritus not  relieved with pharmacotherapy   Prior history of stillbirth before 36 weeks due to ICP with recurring ICP in the current pregnancy  Preexisting or acute hepatic disease with clinical (ie jaundice) or laboratory evidence of worsening hepatic function  If delivery less than 37w0d is planned, betamethasone administration for fetal lung maturity should be considered  Consider re-checking bile acids and liver function tests at 6 weeks postpartum; if still abnormal, recommend referral to Hepatology.      Thank you for allowing us to participate in the care of your patient. Please do not hesitate to contact us if you have further questions regarding the management of your patient.                                                                                                        I personally spent a total of 45 minutes, including both face-to-face and non-face-to-face on the date of the encounter, addressing the above diagnosis. Activities performed in this time include chart review, obtaining / reviewing history, performing a medically necessary evaluation, documentation and counseling/care coordination/ordering tests/ordering meds.      Belgica Bernal MD  Maternal Fetal Medicine   Date of Service (when I saw the patient): 7/11/2024

## 2024-07-11 NOTE — NURSING NOTE
Jennifer Felipe is a  at 25w4d who presents to [facility] for scheduled follow up ultrasound along with MD consult.  SBAR given to Dr. RAHEEM Bernal, see note in Epic.

## 2024-07-12 ENCOUNTER — LAB (OUTPATIENT)
Dept: LAB | Facility: CLINIC | Age: 34
End: 2024-07-12
Payer: COMMERCIAL

## 2024-07-12 DIAGNOSIS — O26.642 CHOLESTASIS DURING PREGNANCY IN SECOND TRIMESTER: ICD-10-CM

## 2024-07-12 LAB
ALBUMIN SERPL BCG-MCNC: 3.2 G/DL (ref 3.5–5.2)
ALP SERPL-CCNC: 240 U/L (ref 40–150)
ALT SERPL W P-5'-P-CCNC: 28 U/L (ref 0–50)
AST SERPL W P-5'-P-CCNC: 27 U/L (ref 0–45)
BILIRUB DIRECT SERPL-MCNC: 0.29 MG/DL (ref 0–0.3)
BILIRUB SERPL-MCNC: 0.5 MG/DL
PROT SERPL-MCNC: 6.8 G/DL (ref 6.4–8.3)

## 2024-07-12 PROCEDURE — 82239 BILE ACIDS TOTAL: CPT | Mod: 90

## 2024-07-12 PROCEDURE — 80076 HEPATIC FUNCTION PANEL: CPT

## 2024-07-12 PROCEDURE — 99000 SPECIMEN HANDLING OFFICE-LAB: CPT

## 2024-07-12 PROCEDURE — 36415 COLL VENOUS BLD VENIPUNCTURE: CPT

## 2024-07-13 LAB — BILE AC SERPL-SCNC: 168 UMOL/L

## 2024-07-25 ENCOUNTER — PRENATAL OFFICE VISIT (OUTPATIENT)
Dept: OBGYN | Facility: CLINIC | Age: 34
End: 2024-07-25
Payer: COMMERCIAL

## 2024-07-25 VITALS
DIASTOLIC BLOOD PRESSURE: 73 MMHG | TEMPERATURE: 97.5 F | HEART RATE: 81 BPM | SYSTOLIC BLOOD PRESSURE: 114 MMHG | WEIGHT: 168 LBS | BODY MASS INDEX: 28.84 KG/M2

## 2024-07-25 DIAGNOSIS — O09.90 HIGH-RISK PREGNANCY, UNSPECIFIED TRIMESTER: Primary | ICD-10-CM

## 2024-07-25 DIAGNOSIS — O26.642 CHOLESTASIS DURING PREGNANCY IN SECOND TRIMESTER: ICD-10-CM

## 2024-07-25 PROCEDURE — 99207 PR PRENATAL VISIT: CPT | Performed by: OBSTETRICS & GYNECOLOGY

## 2024-07-25 RX ORDER — CHOLECALCIFEROL (VITAMIN D3) 50 MCG
1 TABLET ORAL DAILY
Qty: 100 TABLET | Refills: 3 | Status: SHIPPED | OUTPATIENT
Start: 2024-07-25

## 2024-07-25 NOTE — PROGRESS NOTES
27w4d   Feeling well.  The itching has reduced to minimal. Baby is active.    Has been taking urodiol.  Also taking PNV and iron.    Reviewed MFM US from .  discussed recommendations for  testing.   not sure they can do twice weekly BPP's at 32 wks.   discussed reasoning for testing.  discussed plan for delivery ~ 36-39 wks pending bile acid levels.       Will recheck iron, hgb and bile acids at next visit. RR

## 2024-07-26 ENCOUNTER — TELEPHONE (OUTPATIENT)
Dept: DERMATOLOGY | Facility: CLINIC | Age: 34
End: 2024-07-26
Payer: COMMERCIAL

## 2024-08-02 ENCOUNTER — HOSPITAL ENCOUNTER (OUTPATIENT)
Dept: ULTRASOUND IMAGING | Facility: CLINIC | Age: 34
Discharge: HOME | End: 2024-08-02
Attending: OBSTETRICS & GYNECOLOGY
Payer: COMMERCIAL

## 2024-08-02 ENCOUNTER — OFFICE VISIT (OUTPATIENT)
Dept: MATERNAL FETAL MEDICINE | Facility: CLINIC | Age: 34
End: 2024-08-02
Attending: OBSTETRICS & GYNECOLOGY
Payer: COMMERCIAL

## 2024-08-02 ENCOUNTER — OFFICE VISIT (OUTPATIENT)
Dept: ALLERGY | Facility: CLINIC | Age: 34
End: 2024-08-02
Payer: COMMERCIAL

## 2024-08-02 ENCOUNTER — PRE VISIT (OUTPATIENT)
Dept: ALLERGY | Facility: CLINIC | Age: 34
End: 2024-08-02

## 2024-08-02 ENCOUNTER — LAB (OUTPATIENT)
Dept: LAB | Facility: CLINIC | Age: 34
End: 2024-08-02
Payer: COMMERCIAL

## 2024-08-02 DIAGNOSIS — Z88.9 DRUG ALLERGY: ICD-10-CM

## 2024-08-02 DIAGNOSIS — Z88.0 PERSONAL HISTORY OF ALLERGY TO PENICILLIN: ICD-10-CM

## 2024-08-02 DIAGNOSIS — Z88.0 PENICILLIN ALLERGY: ICD-10-CM

## 2024-08-02 DIAGNOSIS — O26.642 CHOLESTASIS DURING PREGNANCY IN SECOND TRIMESTER: ICD-10-CM

## 2024-08-02 DIAGNOSIS — Z88.9 ATOPY: ICD-10-CM

## 2024-08-02 DIAGNOSIS — O26.643 CHOLESTASIS DURING PREGNANCY IN THIRD TRIMESTER: Primary | ICD-10-CM

## 2024-08-02 DIAGNOSIS — R76.8 ELEVATED IGE LEVEL: ICD-10-CM

## 2024-08-02 DIAGNOSIS — Z88.9 DRUG ALLERGY: Primary | ICD-10-CM

## 2024-08-02 LAB
BASOPHILS # BLD AUTO: 0 10E3/UL (ref 0–0.2)
BASOPHILS NFR BLD AUTO: 0 %
EOSINOPHIL # BLD AUTO: 0.1 10E3/UL (ref 0–0.7)
EOSINOPHIL NFR BLD AUTO: 1 %
ERYTHROCYTE [DISTWIDTH] IN BLOOD BY AUTOMATED COUNT: 16.1 % (ref 10–15)
HCT VFR BLD AUTO: 36.8 % (ref 35–47)
HGB BLD-MCNC: 11.3 G/DL (ref 11.7–15.7)
IMM GRANULOCYTES # BLD: 0.1 10E3/UL
IMM GRANULOCYTES NFR BLD: 1 %
LYMPHOCYTES # BLD AUTO: 1.2 10E3/UL (ref 0.8–5.3)
LYMPHOCYTES NFR BLD AUTO: 14 %
MCH RBC QN AUTO: 24.5 PG (ref 26.5–33)
MCHC RBC AUTO-ENTMCNC: 30.7 G/DL (ref 31.5–36.5)
MCV RBC AUTO: 80 FL (ref 78–100)
MONOCYTES # BLD AUTO: 0.4 10E3/UL (ref 0–1.3)
MONOCYTES NFR BLD AUTO: 5 %
NEUTROPHILS # BLD AUTO: 6.6 10E3/UL (ref 1.6–8.3)
NEUTROPHILS NFR BLD AUTO: 79 %
NRBC # BLD AUTO: 0 10E3/UL
NRBC BLD AUTO-RTO: 0 /100
PLATELET # BLD AUTO: 240 10E3/UL (ref 150–450)
RBC # BLD AUTO: 4.62 10E6/UL (ref 3.8–5.2)
WBC # BLD AUTO: 8.4 10E3/UL (ref 4–11)

## 2024-08-02 PROCEDURE — 95018 ALL TSTG PERQ&IQ DRUGS/BIOL: CPT | Performed by: DERMATOLOGY

## 2024-08-02 PROCEDURE — 36415 COLL VENOUS BLD VENIPUNCTURE: CPT | Performed by: PATHOLOGY

## 2024-08-02 PROCEDURE — 76819 FETAL BIOPHYS PROFIL W/O NST: CPT

## 2024-08-02 PROCEDURE — 85025 COMPLETE CBC W/AUTO DIFF WBC: CPT | Performed by: PATHOLOGY

## 2024-08-02 PROCEDURE — 99204 OFFICE O/P NEW MOD 45 MIN: CPT | Mod: 25 | Performed by: DERMATOLOGY

## 2024-08-02 PROCEDURE — 76819 FETAL BIOPHYS PROFIL W/O NST: CPT | Mod: 26 | Performed by: OBSTETRICS & GYNECOLOGY

## 2024-08-02 PROCEDURE — 99000 SPECIMEN HANDLING OFFICE-LAB: CPT | Performed by: PATHOLOGY

## 2024-08-02 PROCEDURE — 82785 ASSAY OF IGE: CPT | Performed by: DERMATOLOGY

## 2024-08-02 NOTE — LETTER
2024      Jennifer Felipe  [address] [facility] [address]      Dear Colleague,    Thank you for referring your patient, Jennifer Felipe, to the [facility] [place]. Please see a copy of my visit note below.    [facility] Dermato-allergology Note  Office visit  Encounter Date: Aug 2, 2024  ____________________________________________    CC: Allergy Consult (Reports PCN allergy, planned . Reports having taken pain killer (possibly combination with PCN?), had mouth/whole body itching/whole body red raised itchy rash, has happened x3 in total, happened immediately after first dose, hydroxyzine helped with itching. )      HPI:  (Aug 2, 2024)  Ms. Jennifer Felipe is a(n) 34 year old female accompanied by her  (who is a botanist) who presents today as new patient for allergy consultation  - Referred by Dr. Audelia Ozuna (OB/GYN) to assess for documented penicillin allergy  - Currently 28 weeks pregnant, with expected due date 10/20/24 ( currently scheduled for 10/14/24)  - This is her 2nd pregnancy  - Next appointment with Dr. Ozuna scheduled for 8/15/24  - Also following with maternal & fetal med specialist Dr. Belgica Bernal for intrahepatic cholestasis of pregnancy   - For pruritic disorder (before formal ICP diagnosis), Dr. Ozuna prescribed hydroxyzine 25 mg PO up to TID PRN itching  - Allergy was [facility] in [facility] on 22 without additional details  - On that day, only other encounter in Epic is preventative health visit with Dr. Brett La, and no other details are included in that visit note  - When she was in [place], she took pain killers and abx, had 3 episodes of:  - When she was in [place], would take pain killers (she believes it was diclofenac)  - Last time she had allergic symptoms, had rash on her breast, and was prescribed an abx, but she does not remember what it was  - Itching inside her mouth and all over her body  -  Rashes all over her body  - Bloating and vomiting  - Went to the hospital, given some medicine  - Has not taken hydroxyzine  - Denies any other allergies  - Otherwise feeling well in usual state of health    Physical Exam:  General: In no acute distress, well-developed, well-nourished  Eyes: no conjunctivitis  ENT: no signs of rhinitis   Pulmonary: no wheezing or coughing  Skin: Focused examination of the skin on test sites was performed = see test results below    Past Medical History:   Patient Active Problem List   Diagnosis     History of jaundice     Need for Tdap vaccination     Family history of thyroid disease in mother     Cholestatic jaundice during pregnancy in third trimester     Supervision of other normal pregnancy, antepartum     Past Medical History:   Diagnosis Date     Cholestasis during pregnancy      Allergies:  Allergies   Allergen Reactions     Penicillin G Angioedema, Nausea and Vomiting, Hives and Itching     8/2/24 Dr. Willie Gooden: Drug allergy testing completed. +/++ to penicillin G and + to ampicillin. No reaction to cefazolin. Avoid ALL penicillin antibiotics (including Benzylpenicilloyl-poly-Lysin), but cephalosporins can be used. Penem antibiotics have not been tested.     Medications:  Current Outpatient Medications   Medication Sig Dispense Refill     ursodiol (ACTIGALL) 300 MG capsule Take 1 capsule (300 mg) by mouth 3 times daily for 200 days 270 capsule 2     cyanocobalamin (VITAMIN B-12) 1000 MCG tablet Take 1 tablet (1,000 mcg) by mouth daily 100 tablet 3     cyanocobalamin (VITAMIN B-12) 500 MCG tablet Take 2 tablets (1,000 mcg) by mouth daily 180 tablet 1     hydrOXYzine HCl (ATARAX) 25 MG tablet Take 1 tablet (25 mg) by mouth 3 times daily as needed for itching (Patient not taking: Reported on 8/2/2024) 40 tablet 1     Iron, Ferrous Sulfate, 325 (65 Fe) MG TABS Take 1 tablet by mouth every other day 45 tablet 1     Prenatal Vit-DSS-Fe Cbn-FA (PRENATAL AD PO)        Prenatal  Vit-Fe Fumarate-FA (PRENATAL MULTIVITAMIN W/IRON) 27-0.8 MG tablet Take 1 tablet by mouth daily (Patient not taking: Reported on 2024) 90 tablet 3     vitamin C (ASCORBIC ACID) 250 MG tablet Take 1 tablet (250 mg) by mouth daily 90 tablet 1     vitamin D3 (CHOLECALCIFEROL) 50 mcg (2000 units) tablet Take 1 tablet (50 mcg) by mouth daily 100 tablet 3     No current facility-administered medications for this visit.     Social History:  The patient .... Patient has the following hobbies or non-occupational exposure: N/A.    Family History:  Family History   Problem Relation Age of Onset     Hypertension Mother      Thyroid Disease Mother      No Known Problems Father      No Known Problems Brother      No Known Problems Brother      No Known Problems Brother      No Known Problems Daughter      Previous Labs, Allergy Tests, Dermatopathology, Imaging:  [Upon review of [facility] chart, no prior allergist records as of 24.]     24 Dr. Audelia Ozuna (OB/GYN)  27w4d   Feeling well.  The itching has reduced to minimal. Baby is active.    Has been taking urodiol.  Also taking PNV and iron.    Reviewed MFM US from .  discussed recommendations for  testing.   not sure they can do twice weekly BPP's at 32 wks.   discussed reasoning for testing.  discussed plan for delivery ~ 36-39 wks pending bile acid levels.       Will recheck iron, hgb and bile acids at next visit.    24 Dr. Audelia Ozuna (OB/GYN)  25w2d  Bile acids came back at 339 last week.   Feeling so much better after starting ursodiol. The itching is 80% better. Vistaril is helping as well.   Starting to sleep better after taking meds.  discussed repeating bile acids, but has only been on ursodiol for 3 days. Plan to repeat labs after a week, to assess improvement. She has an MFM consult later this week. Baby is active.  discussed ICP and possible need for early delivery if bile acids remain elevated.    24 Dr. Win  Sulma (OB/GYN) - visit when referral to this allergy clinic was initiated  FROM HPI:  24w2d  complains of itching and rash all over her body including extremities and torso, palms and soles.  The symptoms started a few months ago, but more significant for the past 2 wks.  Wakes up itching at night especially.  Palms are in particular itchy.  She had this same thing in the 3rd tri last pregnancy.  Started sooner with this pregnancy.   Reviewed diet and weight gain.   She is not a vegetarian but does not eat much meat.  Will check B12 and iron today.      Planning to start prenatal yoga.   Reviewed MFM US from 24 showed thin TOO at 1.8 cm.  Patient has planned on repeat  section.  Will follow-up in 4-6 wks per MFM recommendation.     FROM A&P:  Pruritic disorder  Comment: discussed possible ICP vs immune reaction to pregnancy  Plan: Bile acids total, Hepatic panel (Albumin, ALT, AST, Bili, Alk Phos, TP), hydrOXYzine HCl (ATARAX) 25 MG tablet, cetirizine (ZYRTEC) 10 MG tablet          Personal history of allergy to penicillin  Comment:    Plan: Adult Allergy/Asthma  Referral Dr Borja     [address] [facility]                  Referred By: Dr. Audelia Ozuna (OB/GYN)    Allergy Tests:  Past Allergy Test - see above    Order for Future Allergy Testing:    [x] Outpatient  [] Inpatient: [facility]..../ Bed ....       Skin Atopy (atopic dermatitis) [] Yes   [x] No .........  Contact allergies:   [] Yes   [] No ..........  Hand eczema:   [] Yes   [] No           Leading hand:   [] R   [] L       [] Ambidextrous         Drug allergies:        [x] Yes   [] No  which?......see HPI    Urticaria/Angioedema  [x] Yes   [] No ........see HPI  Food Allergy:  [] Yes   [x] No  which?......  Pets :  [] Yes   [x] No  which?......         []  Rhinitis   [] Conjunctivitis   [] Sinusitis   [] Polyposis   [] Otitis   [] Pharyngitis         []  Postnasal drip    [x]  none  Operations:   []  Tonsils   [] Septum   [] Sinus   [] Polyposis        [] Asthma bronchiale   [] Coughing      [x]  none  Symptoms (mostly rhinoconjunctivitis and asthma) aggravated by:  Season   [] I   [] II   [] III   [] IV   []V   []VI   []VII   []VIII   []IX   []X   []XI   []XII     [] perennial   Day time      [] morning   [] noon      [] evening        [] night    [] whole day........  []  none  Location/changes    [] inside        [] outside   [] mountains    [] sea     [] others.............   []  none  Triggers, specific     [] animals     [] plants     [] dust              [] others ...........................    []  none  Triggers, others       [] work          [] psyche    [] sport            [] others .............................  []  none  Irritant                [] phys efforts [] smoke    [] heat/cold     [] odors  []others............... []  none    Order for PATCH TESTS  Reason for tests (suspected allergy): not necessary at this time  Known previous allergies: N/A  Standardized panels  [] Standard panel (40 tests)  [] Preservatives & Antimicrobials (31 tests)  [] Emulsifiers & Additives (25 tests)   [] Perfumes/Flavours & Plants (25 tests)  [] Hairdresser panel (12 tests)  [] Rubber Chemicals (22 tests)  [] Plastics (26 tests)  [] Colorants/Dyes/Food additives (20 tests)  [] Metals (implants/dental) (24 tests)  [] Local anaesthetics/NSAIDs (13 tests)  [] Antibiotics & Antimycotics (14 tests)   [] Corticosteroids (15 tests)   [] Photopatch test (62 tests)   [] Others:       [] Patient's Own Products:   DO NOT test if chemical or biological identity is unknown!   always ask from patient the product information and safety sheets (MSDS)       Order for PRICK TESTS    Reason for tests (suspected allergy): elevated total IgE  Known previous allergies: none    Standardized prick panels  [x] Atopic panel (20 tests)  [] Pediatric Panel (12 tests)  [] Milk, Meat, Eggs, Grains (20 tests)   [] Dust, Epithelia, Feathers (10  tests)  [] Fish, Seafood, Shellfish (17 tests)  [] Nuts, Beans (14 tests)  [] Spice, Vegetable, Fruit (17 tests)  [] Pollen Panel = Tree, Grass, Weed (24 tests)  [] Others:       [] Patient's Own Products:   DO NOT test if chemical or biological identity is unknown!   always ask from patient the product information and safety sheets (MSDS)     Standardized intradermal tests    [] Alternaria alternata  [] Aspergillus fumigatus  [] Cladosporium herbarum   [] Penicillium notatum  [] Dermatophagoides farinae  [] Dermatophagoides pteronyssinus  [] Dog Epithelium  [] Cat Epithelium  [] Others:     [] Bee venom   [] Wasp venom  !!Specific protocol with dilutions!!       Order for Drug allergy tests (prick & intradermal tests)    [x] Penicillin G  [x] Ampicillin   [x] Cefazolin (to rule out cross reactions, as this is a good alternative abx during pregnancy)  [] Ceftriaxone   [] Ceftazidime  [] Bactrim    [] Others:   Order for 8/2/24 as test date    DRUG ALLERGY TEST SERIES Aug 2, 2024     Controls Supplier [place] Result (15min) Remarks   Pos Histamine Hydrochloride ALK-[place]ell[place] B091 1 mg/ml ++    Neg NaCl Allergopharma 0.9% -         Prick Tests         Substance/ Allergen Conc Result (20 min) Remarks    Cefazolin[1] 100 mg/ml -     Benzylpenicillin (Penicillin G) 1 [place] IU/ml (600 mg) +/++     Ampicillin 100mg/ml +       Intradermal Tests   immed immed delay delay      Substance Conc 1st dil  2nd dil  2 days  4 days  Remarks    Cefazolin[1] 1:5 -       *  Methoxyimino-group (crossreactive IgE)  [1]  Cefalexin/Cefazolin-group        [2]    Cefotaxim-group     [3]     Cefuroxim-group      Assessment & Plan:    ==> Final Diagnosis:     # Proven allergy to the beta-lactams (e.g. Penicillin G)  In 2021 in [place], IgEs to PPL V and G were low positive; IgEs for amoxicillin and ampicillin negative  >> prick tests to Penicillin G > Ampicillin positive    # In history, no clear signs for atopy  2021 total IgE also elevated, but  eosinophils not taken per available records, and recent CBCs did not include differential    These conclusions are made at the best of one's knowledge and belief based on the provided evidence such as patient's history and allergy test results and they can change over time or can be incomplete because of missing information.    ==> Treatment Plan:    >> Detailed discussion with patient and her  regarding general pathophysiology of allergens and, more specifically, to antibiotics and NSAIDs.    >> Penicillin G updated in Epic allergy list:  - Added reactions: angioedema, nausea and vomiting, hives, itching  - Added severity: high  - Added Reaction Type: Allergy or Hypersensitivity  - Added Comment: 8/2/24 Dr. Willie Gooden: Drug allergy testing completed. +/++ to penicillin G and + to ampicillin. No reaction to cefazolin. Avoid ALL penicillin antibiotics (including Benzylpenicilloyl-poly-Lysin), but cephalosporins can be used. Penem antibiotics have not been tested.    >> Ordered labs today to determine if IgE is again elevated and to rule out eosinophilia: total IgE; CBC diff.  - If patient has elevated IgE, should investigate further, as patient has no clear signs for atopy in history.    >> After pregnancy, they could consider performing atopy screen along with prick and intradermal tests to PPL (to confirm if she has a specific allergy to it) and meropenem.  - As of today, there are no available labs to re-test specific IgEs for PPL and MDM.    >> For IDT test site from today, patient will monitor sites for the next 2 and 4 days. If there are any delayed reactions, patient will take photos and [facility].    Procedures Performed: Drug allergy tests    Staff Involved: Provider, Staff, Resident, and Scribe    Scribe Disclosure:   ASYA RUIZ, am serving as a scribe to document services personally performed by Willie Gooden MD based on data collection and the provider's  statements to me.     Staff Physician Comments:  I was present with the scribe who participated in the documentation of the note. I have verified the history and personally performed the physical exam and medical decision making. I agree with the assessment and plan as documented in the note. I have reviewed and if necessary amended the note.      Also, I saw and evaluated the patient with the resident and I agree with the assessment and plan as documented in the resident's note.    Willie Gooden MD  Professor  Head of Dermato-Allergy Division  Department of Dermatology  [facility]     Follow-up in Derm-Allergy clinic PRN    I spent a total of 40 minutes with Jennifer Felipe. This time was spent counseling the patient and/or coordinating care, explaining the allergy tests, performing allergy tests and assessing the clinical relevance and adjusting the allergy list in Epic      Again, thank you for allowing me to participate in the care of your patient.        Sincerely,        Willie Gooden MD

## 2024-08-02 NOTE — PROGRESS NOTES
[facility] Dermato-allergology Note  Office visit  Encounter Date: Aug 2, 2024  ____________________________________________    CC: Allergy Consult (Reports PCN allergy, planned . Reports having taken pain killer (possibly combination with PCN?), had mouth/whole body itching/whole body red raised itchy rash, has happened x3 in total, happened immediately after first dose, hydroxyzine helped with itching. )      HPI:  (Aug 2, 2024)  Ms. Jennifer Felipe is a(n) 34 year old female accompanied by her  (who is a botanist) who presents today as new patient for allergy consultation  - Referred by Dr. Audelia Ozuna (OB/GYN) to assess for documented penicillin allergy  - Currently 28 weeks pregnant, with expected due date 10/20/24 ( currently scheduled for 10/14/24)  - This is her 2nd pregnancy  - Next appointment with Dr. Ozuna scheduled for 8/15/24  - Also following with maternal & fetal med specialist Dr. Belgica Bernal for intrahepatic cholestasis of pregnancy   - For pruritic disorder (before formal ICP diagnosis), Dr. Ozuna prescribed hydroxyzine 25 mg PO up to TID PRN itching  - Allergy was noted in [facility] on 22 without additional details  - On that day, only other encounter in Epic is preventative health visit with Dr. Brett La, and no other details are included in that visit note  - When she was in [place], she took pain killers and abx, had 3 episodes of:  - When she was in [place], would take pain killers (she believes it was diclofenac)  - Last time she had allergic symptoms, had rash on her breast, and was prescribed an abx, but she does not remember what it was  - Itching inside her mouth and all over her body  - Rashes all over her body  - Bloating and vomiting  - Went to the hospital, given some medicine  - Has not taken hydroxyzine  - Denies any other allergies  - Otherwise feeling well in usual state of health    Physical Exam:  General: In no acute  distress, well-developed, well-nourished  Eyes: no conjunctivitis  ENT: no signs of rhinitis   Pulmonary: no wheezing or coughing  Skin: Focused examination of the skin on test sites was performed = see test results below    Past Medical History:   Patient Active Problem List   Diagnosis    History of jaundice    Need for Tdap vaccination    Family history of thyroid disease in mother    Cholestatic jaundice during pregnancy in third trimester    Supervision of other normal pregnancy, antepartum     Past Medical History:   Diagnosis Date    Cholestasis during pregnancy      Allergies:  Allergies   Allergen Reactions    Penicillin G Angioedema, Nausea and Vomiting, Hives and Itching     8/2/24 Dr. Willie Gooden: Drug allergy testing completed. +/++ to penicillin G and + to ampicillin. No reaction to cefazolin. Avoid ALL penicillin antibiotics (including Benzylpenicilloyl-poly-Lysin), but cephalosporins can be used. Penem antibiotics have not been tested.     Medications:  Current Outpatient Medications   Medication Sig Dispense Refill    ursodiol (ACTIGALL) 300 MG capsule Take 1 capsule (300 mg) by mouth 3 times daily for 200 days 270 capsule 2    cyanocobalamin (VITAMIN B-12) 1000 MCG tablet Take 1 tablet (1,000 mcg) by mouth daily 100 tablet 3    cyanocobalamin (VITAMIN B-12) 500 MCG tablet Take 2 tablets (1,000 mcg) by mouth daily 180 tablet 1    hydrOXYzine HCl (ATARAX) 25 MG tablet Take 1 tablet (25 mg) by mouth 3 times daily as needed for itching (Patient not taking: Reported on 8/2/2024) 40 tablet 1    Iron, Ferrous Sulfate, 325 (65 Fe) MG TABS Take 1 tablet by mouth every other day 45 tablet 1    Prenatal Vit-DSS-Fe Cbn-FA (PRENATAL AD PO)       Prenatal Vit-Fe Fumarate-FA (PRENATAL MULTIVITAMIN W/IRON) 27-0.8 MG tablet Take 1 tablet by mouth daily (Patient not taking: Reported on 7/25/2024) 90 tablet 3    vitamin C (ASCORBIC ACID) 250 MG tablet Take 1 tablet (250 mg) by mouth daily 90 tablet 1    vitamin D3  (CHOLECALCIFEROL) 50 mcg (2000 units) tablet Take 1 tablet (50 mcg) by mouth daily 100 tablet 3     No current facility-administered medications for this visit.     Social History:  The patient .... Patient has the following hobbies or non-occupational exposure: N/A.    Family History:  Family History   Problem Relation Age of Onset    Hypertension Mother     Thyroid Disease Mother     No Known Problems Father     No Known Problems Brother     No Known Problems Brother     No Known Problems Brother     No Known Problems Daughter      Previous Labs, Allergy Tests, Dermatopathology, Imaging:  [Upon review of [facility] chart, no prior allergist records as of 24.]     24 Dr. Audelia Ozuna (OB/GYN)  27w4d   Feeling well.  The itching has reduced to minimal. Baby is active.    Has been taking urodiol.  Also taking PNV and iron.    Reviewed MFM US from .  discussed recommendations for  testing.   not sure they can do twice weekly BPP's at 32 wks.   discussed reasoning for testing.  discussed plan for delivery ~ 36-39 wks pending bile acid levels.       Will recheck iron, hgb and bile acids at next visit.    24 Dr. Audelia Ozuna (OB/GYN)  25w2d  Bile acids came back at 339 last week.   Feeling so much better after starting ursodiol. The itching is 80% better. Vistaril is helping as well.   Starting to sleep better after taking meds.  discussed repeating bile acids, but has only been on ursodiol for 3 days. Plan to repeat labs after a week, to assess improvement. She has an MFM consult later this week. Baby is active.  discussed ICP and possible need for early delivery if bile acids remain elevated.    24 Dr. Audelia Ozuna (OB/GYN) - visit when referral to this allergy clinic was initiated  FROM HPI:  24w2d  complains of itching and rash all over her body including extremities and torso, palms and soles.  The symptoms started a few months ago, but more significant for the past 2 wks.   Wakes up itching at night especially.  Palms are in particular itchy.  She had this same thing in the 3rd tri last pregnancy.  Started sooner with this pregnancy.   Reviewed diet and weight gain.   She is not a vegetarian but does not eat much meat.  Will check B12 and iron today.      Planning to start prenatal yoga.   Reviewed MFM US from 24 showed thin TOO at 1.8 cm.  Patient has planned on repeat  section.  Will follow-up in 4-6 wks per MFM recommendation.     FROM A&P:  Pruritic disorder  Comment: discussed possible ICP vs immune reaction to pregnancy  Plan: Bile acids total, Hepatic panel (Albumin, ALT, AST, Bili, Alk Phos, TP), hydrOXYzine HCl (ATARAX) 25 MG tablet, cetirizine (ZYRTEC) 10 MG tablet          Personal history of allergy to penicillin  Comment:    Plan: Adult Allergy/Asthma  Referral Dr Borja     [facility]                  Referred By: Dr. Audelia Ozuna (OB/GYN)    Allergy Tests:  Past Allergy Test - see above    Order for Future Allergy Testing:    [x] Outpatient  [] Inpatient: [facility]..../ Bed ....       Skin Atopy (atopic dermatitis) [] Yes   [x] No .........  Contact allergies:   [] Yes   [] No ..........  Hand eczema:   [] Yes   [] No           Leading hand:   [] R   [] L       [] Ambidextrous         Drug allergies:        [x] Yes   [] No  which?......see HPI    Urticaria/Angioedema  [x] Yes   [] No ........see HPI  Food Allergy:  [] Yes   [x] No  which?......  Pets :  [] Yes   [x] No  which?......         []  Rhinitis   [] Conjunctivitis   [] Sinusitis   [] Polyposis   [] Otitis   [] Pharyngitis         []  Postnasal drip    [x]  none  Operations:   [] Tonsils   [] Septum   [] Sinus   [] Polyposis        [] Asthma bronchiale   [] Coughing      [x]  none  Symptoms (mostly rhinoconjunctivitis and asthma) aggravated by:  Season   [] I   [] II   [] III   [] IV   []V   []VI   []VII   []VIII   []IX   []X   []XI   []XII     [] perennial    Day time      [] morning   [] noon      [] evening        [] night    [] whole day........  []  none  Location/changes    [] inside        [] outside   [] mountains    [] sea     [] others.............   []  none  Triggers, specific     [] animals     [] plants     [] dust              [] others ...........................    []  none  Triggers, others       [] work          [] psyche    [] sport            [] others .............................  []  none  Irritant                [] phys efforts [] smoke    [] heat/cold     [] odors  []others............... []  none    Order for PATCH TESTS  Reason for tests (suspected allergy): not necessary at this time  Known previous allergies: N/A  Standardized panels  [] Standard panel (40 tests)  [] Preservatives & Antimicrobials (31 tests)  [] Emulsifiers & Additives (25 tests)   [] Perfumes/Flavours & Plants (25 tests)  [] Hairdresser panel (12 tests)  [] Rubber Chemicals (22 tests)  [] Plastics (26 tests)  [] Colorants/Dyes/Food additives (20 tests)  [] Metals (implants/dental) (24 tests)  [] Local anaesthetics/NSAIDs (13 tests)  [] Antibiotics & Antimycotics (14 tests)   [] Corticosteroids (15 tests)   [] Photopatch test (62 tests)   [] Others:       [] Patient's Own Products:   DO NOT test if chemical or biological identity is unknown!   always ask from patient the product information and safety sheets (MSDS)       Order for PRICK TESTS    Reason for tests (suspected allergy): elevated total IgE  Known previous allergies: none    Standardized prick panels  [x] Atopic panel (20 tests)  [] Pediatric Panel (12 tests)  [] Milk, Meat, Eggs, Grains (20 tests)   [] Dust, Epithelia, Feathers (10 tests)  [] Fish, Seafood, Shellfish (17 tests)  [] Nuts, Beans (14 tests)  [] Spice, Vegetable, Fruit (17 tests)  [] Pollen Panel = Tree, Grass, Weed (24 tests)  [] Others:       [] Patient's Own Products:   DO NOT test if chemical or biological identity is unknown!   always ask from  patient the product information and safety sheets (MSDS)     Standardized intradermal tests    [] Alternaria alternata  [] Aspergillus fumigatus  [] Cladosporium herbarum   [] Penicillium notatum  [] Dermatophagoides farinae  [] Dermatophagoides pteronyssinus  [] Dog Epithelium  [] Cat Epithelium  [] Others:     [] Bee venom   [] Wasp venom  !!Specific protocol with dilutions!!       Order for Drug allergy tests (prick & intradermal tests)    [x] Penicillin G  [x] Ampicillin   [x] Cefazolin (to rule out cross reactions, as this is a good alternative abx during pregnancy)  [] Ceftriaxone   [] Ceftazidime  [] Bactrim    [] Others:   Order for 8/2/24 as test date    DRUG ALLERGY TEST SERIES Aug 2, 2024     Controls Supplier Concen Result (15min) Remarks   Pos Histamine Hydrochloride ALK-Abelló B091 1 mg/ml ++    Neg NaCl Allergopharma 0.9% -         Prick Tests         Substance/ Allergen Conc Result (20 min) Remarks    Cefazolin[1] 100 mg/ml -     Benzylpenicillin (Penicillin G) 1 Norton IU/ml (600 mg) +/++     Ampicillin 100mg/ml +       Intradermal Tests   immed immed delay delay      Substance Conc 1st dil  2nd dil  2 days  4 days  Remarks    Cefazolin[1] 1:5 -       *  Methoxyimino-group (crossreactive IgE)  [1]  Cefalexin/Cefazolin-group        [2]    Cefotaxim-group     [3]     Cefuroxim-group      Assessment & Plan:    ==> Final Diagnosis:     # Proven allergy to the beta-lactams (e.g. Penicillin G)  In 2021 in [place], IgEs to PPL V and G were low positive; IgEs for amoxicillin and ampicillin negative  >> prick tests to Penicillin G > Ampicillin positive    # In history, no clear signs for atopy  2021 total IgE also elevated, but eosinophils not taken per available records, and recent CBCs did not include differential    These conclusions are made at the best of one's knowledge and belief based on the provided evidence such as patient's history and allergy test results and they can change over time or can be  incomplete because of missing information.    ==> Treatment Plan:    >> Detailed discussion with patient and her  regarding general pathophysiology of allergens and, more specifically, to antibiotics and NSAIDs.    >> Penicillin G updated in Epic allergy list:  - Added reactions: angioedema, nausea and vomiting, hives, itching  - Added severity: high  - Added Reaction Type: Allergy or Hypersensitivity  - Added Comment: 8/2/24 Dr. Willie Gooden: Drug allergy testing completed. +/++ to penicillin G and + to ampicillin. No reaction to cefazolin. Avoid ALL penicillin antibiotics (including Benzylpenicilloyl-poly-Lysin), but cephalosporins can be used. Penem antibiotics have not been tested.    >> Ordered labs today to determine if IgE is again elevated and to rule out eosinophilia: total IgE; CBC diff.  - If patient has elevated IgE, should investigate further, as patient has no clear signs for atopy in history.    >> After pregnancy, they could consider performing atopy screen along with prick and intradermal tests to PPL (to confirm if she has a specific allergy to it) and meropenem.  - As of today, there are no available labs to re-test specific IgEs for PPL and MDM.    >> For IDT test site from today, patient will monitor sites for the next 2 and 4 days. If there are any delayed reactions, patient will take photos and report to clinic via [employer] for review.    Procedures Performed: Drug allergy tests    Staff Involved: Provider, Staff, Resident, and Scribe    Scribe Disclosure:   I, ASYA KUMAR, am serving as a scribe to document services personally performed by Willie Gooden MD based on data collection and the provider's statements to me.     Staff Physician Comments:  I was present with the scribe who participated in the documentation of the note. I have verified the history and personally performed the physical exam and medical decision making. I agree with the assessment and plan as documented in the  note. I have reviewed and if necessary amended the note.      Also, I saw and evaluated the patient with the resident and I agree with the assessment and plan as documented in the resident's note.    Willie Gooden MD  Professor  Head of Dermato-Allergy Division  Department of Dermatology  [facility]     Follow-up in Derm-Allergy clinic PRN    I spent a total of 40 minutes with Jennifer Felipe. This time was spent counseling the patient and/or coordinating care, explaining the allergy tests, performing allergy tests and assessing the clinical relevance and adjusting the allergy list in Epic

## 2024-08-02 NOTE — NURSING NOTE
Chief Complaint   Patient presents with    Allergy Consult     Reports PCN allergy, planned . Reports having taken pain killer (possibly combination with PCN?), had mouth/whole body itching/whole body red raised itchy rash, has happened x3 in total, happened immediately after first dose, hydroxyzine helped with itching.      Dhaval Wolff RN

## 2024-08-02 NOTE — TELEPHONE ENCOUNTER
FUTURE VISIT INFORMATION      FUTURE VISIT INFORMATION:  Date: 8.2.24  Time: 7:30  Location: [facility] [place]  REFERRAL INFORMATION:  Referring provider:  S[place]  Referring providers clinic:  OB/Gyn  Reason for visit/diagnosis  OB ref for PCN testing  left . sent . MS      RECORDS REQUESTED FROM:       Clinic name Comments Records Status Imaging Status   OB/Gyn 7.2.24  [place] Epic

## 2024-08-02 NOTE — PROGRESS NOTES
The patient was seen for an ultrasound in the Maternal-Fetal Medicine Center at the [facility] today.  For a detailed report of the ultrasound examination, please see the ultrasound report which can be found under the imaging tab.    If you have questions regarding today's evaluation or if we can be of further service, please contact the Maternal-Fetal Medicine Center.    Manda Lee MD  , OB/GYN  Maternal-Fetal Medicine  [phone] (Pager)

## 2024-08-02 NOTE — NURSING NOTE
Jennifer is here for her 1st BPP ultrasound today. Patient state symptoms are much improved on medications, has some itching at night. Patient reports positive fetal movement, denies contractions, leaking of fluid, or bleeding. Education provided to patient on BPP/NST.  SBAR given to BENI MCKNIGHT, see their note in Epic.

## 2024-08-04 LAB — IGE SERPL-ACNC: 234 KU/L (ref 0–114)

## 2024-08-09 ENCOUNTER — OFFICE VISIT (OUTPATIENT)
Dept: MATERNAL FETAL MEDICINE | Facility: CLINIC | Age: 34
End: 2024-08-09
Attending: STUDENT IN AN ORGANIZED HEALTH CARE EDUCATION/TRAINING PROGRAM
Payer: COMMERCIAL

## 2024-08-09 ENCOUNTER — HOSPITAL ENCOUNTER (OUTPATIENT)
Dept: ULTRASOUND IMAGING | Facility: CLINIC | Age: 34
Discharge: HOME | End: 2024-08-09
Attending: STUDENT IN AN ORGANIZED HEALTH CARE EDUCATION/TRAINING PROGRAM
Payer: COMMERCIAL

## 2024-08-09 DIAGNOSIS — O26.642 CHOLESTASIS DURING PREGNANCY IN SECOND TRIMESTER: ICD-10-CM

## 2024-08-09 DIAGNOSIS — O26.643 CHOLESTASIS DURING PREGNANCY IN THIRD TRIMESTER: Primary | ICD-10-CM

## 2024-08-09 PROCEDURE — 76819 FETAL BIOPHYS PROFIL W/O NST: CPT | Mod: 26 | Performed by: OBSTETRICS & GYNECOLOGY

## 2024-08-09 PROCEDURE — 76819 FETAL BIOPHYS PROFIL W/O NST: CPT

## 2024-08-09 NOTE — PROGRESS NOTES
Please see the imaging tab for details of the ultrasound performed today.    Nae Garza MD  Specialist in Maternal-Fetal Medicine

## 2024-08-09 NOTE — NURSING NOTE
Patient reports positive fetal movement, denies contractions, leaking of fluid, or bleeding.  Reports itching is almost gone now. Patient denies changes or new medication. Education provided to patient on today's ultrasound.  SBAR given to BENI MCKNIGTH, see their note in Epic.

## 2024-08-15 ENCOUNTER — PRENATAL OFFICE VISIT (OUTPATIENT)
Dept: OBGYN | Facility: CLINIC | Age: 34
End: 2024-08-15
Payer: COMMERCIAL

## 2024-08-15 VITALS
BODY MASS INDEX: 29.01 KG/M2 | SYSTOLIC BLOOD PRESSURE: 99 MMHG | TEMPERATURE: 97.1 F | DIASTOLIC BLOOD PRESSURE: 61 MMHG | HEART RATE: 92 BPM | WEIGHT: 169 LBS

## 2024-08-15 DIAGNOSIS — O26.642 CHOLESTASIS DURING PREGNANCY IN SECOND TRIMESTER: ICD-10-CM

## 2024-08-15 DIAGNOSIS — O09.90 HIGH-RISK PREGNANCY, UNSPECIFIED TRIMESTER: Primary | ICD-10-CM

## 2024-08-15 DIAGNOSIS — Z88.0 PERSONAL HISTORY OF ALLERGY TO PENICILLIN: ICD-10-CM

## 2024-08-15 LAB
ALT SERPL W P-5'-P-CCNC: 27 U/L (ref 0–50)
AST SERPL W P-5'-P-CCNC: 23 U/L (ref 0–45)

## 2024-08-15 PROCEDURE — 84460 ALANINE AMINO (ALT) (SGPT): CPT | Performed by: OBSTETRICS & GYNECOLOGY

## 2024-08-15 PROCEDURE — 84450 TRANSFERASE (AST) (SGOT): CPT | Performed by: OBSTETRICS & GYNECOLOGY

## 2024-08-15 PROCEDURE — 99000 SPECIMEN HANDLING OFFICE-LAB: CPT | Performed by: OBSTETRICS & GYNECOLOGY

## 2024-08-15 PROCEDURE — 82239 BILE ACIDS TOTAL: CPT | Mod: 90 | Performed by: OBSTETRICS & GYNECOLOGY

## 2024-08-15 PROCEDURE — 99207 PR PRENATAL VISIT: CPT | Performed by: OBSTETRICS & GYNECOLOGY

## 2024-08-15 PROCEDURE — 36415 COLL VENOUS BLD VENIPUNCTURE: CPT | Performed by: OBSTETRICS & GYNECOLOGY

## 2024-08-15 NOTE — PROGRESS NOTES
30w4d   Feeling well.  Minimal itching in her palms.  Baby is active.  Has been taking PNV, iron, B12 and last hgb was improved.   Patient has comp US next week.  Will need to dtrm if c/s date has to be moved up due to ICP and thin TOO.   Will check bile acids today.   Reviewed notes from visit with allergy team. High sensitivity to PCN.    Copied from Dr. Willie Gooden note:   Drug allergy testing completed. +/++ to penicillin G and + to ampicillin. No reaction to cefazolin. Avoid ALL penicillin antibiotics (including Benzylpenicilloyl-poly-Lysin), but cephalosporins can be used. Penem antibiotics have not been tested.   Audelia Ozuna MD

## 2024-08-17 LAB — BILE AC SERPL-SCNC: 12 UMOL/L

## 2024-08-22 ENCOUNTER — HOSPITAL ENCOUNTER (OUTPATIENT)
Dept: ULTRASOUND IMAGING | Facility: CLINIC | Age: 34
Discharge: HOME | End: 2024-08-22
Attending: STUDENT IN AN ORGANIZED HEALTH CARE EDUCATION/TRAINING PROGRAM
Payer: COMMERCIAL

## 2024-08-22 ENCOUNTER — OFFICE VISIT (OUTPATIENT)
Dept: MATERNAL FETAL MEDICINE | Facility: CLINIC | Age: 34
End: 2024-08-22
Attending: STUDENT IN AN ORGANIZED HEALTH CARE EDUCATION/TRAINING PROGRAM
Payer: COMMERCIAL

## 2024-08-22 DIAGNOSIS — O26.642 CHOLESTASIS DURING PREGNANCY IN SECOND TRIMESTER: ICD-10-CM

## 2024-08-22 DIAGNOSIS — O26.642 CHOLESTASIS DURING PREGNANCY IN SECOND TRIMESTER: Primary | ICD-10-CM

## 2024-08-22 PROCEDURE — 76819 FETAL BIOPHYS PROFIL W/O NST: CPT

## 2024-08-22 PROCEDURE — 76819 FETAL BIOPHYS PROFIL W/O NST: CPT | Mod: 26 | Performed by: STUDENT IN AN ORGANIZED HEALTH CARE EDUCATION/TRAINING PROGRAM

## 2024-08-22 PROCEDURE — 76816 OB US FOLLOW-UP PER FETUS: CPT | Mod: 26 | Performed by: STUDENT IN AN ORGANIZED HEALTH CARE EDUCATION/TRAINING PROGRAM

## 2024-08-22 PROCEDURE — 76816 OB US FOLLOW-UP PER FETUS: CPT

## 2024-08-22 PROCEDURE — 99213 OFFICE O/P EST LOW 20 MIN: CPT | Mod: 25 | Performed by: STUDENT IN AN ORGANIZED HEALTH CARE EDUCATION/TRAINING PROGRAM

## 2024-08-22 NOTE — NURSING NOTE
Patient reports positive fetal movement, denies pain, denies contractions, leaking of fluid, or bleeding. Education provided to patient on today's ultrasound.  SBAR given to BENI MCKNIGHT, see their note in Epic.

## 2024-08-22 NOTE — PROGRESS NOTES
The patient was seen for an ultrasound in the [facility] Maternal-Fetal Medicine Center today.  For a detailed report of the ultrasound examination, please see the ultrasound report which can be found under the imaging tab.     If you have questions regarding today's evaluation or if we can be of further service, please contact the Maternal-Fetal Medicine Center.    Belgica Bernal MD  Maternal Fetal Medicine

## 2024-08-29 ENCOUNTER — HOSPITAL ENCOUNTER (OUTPATIENT)
Dept: ULTRASOUND IMAGING | Facility: CLINIC | Age: 34
Discharge: HOME | End: 2024-08-29
Attending: OBSTETRICS & GYNECOLOGY
Payer: COMMERCIAL

## 2024-08-29 ENCOUNTER — PRENATAL OFFICE VISIT (OUTPATIENT)
Dept: OBGYN | Facility: CLINIC | Age: 34
End: 2024-08-29
Payer: COMMERCIAL

## 2024-08-29 ENCOUNTER — OFFICE VISIT (OUTPATIENT)
Dept: MATERNAL FETAL MEDICINE | Facility: CLINIC | Age: 34
End: 2024-08-29
Attending: OBSTETRICS & GYNECOLOGY
Payer: COMMERCIAL

## 2024-08-29 VITALS
BODY MASS INDEX: 28.84 KG/M2 | TEMPERATURE: 97 F | SYSTOLIC BLOOD PRESSURE: 109 MMHG | DIASTOLIC BLOOD PRESSURE: 71 MMHG | WEIGHT: 168 LBS | HEART RATE: 96 BPM

## 2024-08-29 DIAGNOSIS — Z98.891 HISTORY OF C-SECTION: ICD-10-CM

## 2024-08-29 DIAGNOSIS — O26.642 CHOLESTASIS DURING PREGNANCY IN SECOND TRIMESTER: ICD-10-CM

## 2024-08-29 DIAGNOSIS — O09.90 HIGH-RISK PREGNANCY, UNSPECIFIED TRIMESTER: Primary | ICD-10-CM

## 2024-08-29 DIAGNOSIS — O26.642 CHOLESTASIS DURING PREGNANCY IN SECOND TRIMESTER: Primary | ICD-10-CM

## 2024-08-29 PROCEDURE — 99207 PR PRENATAL VISIT: CPT | Performed by: OBSTETRICS & GYNECOLOGY

## 2024-08-29 PROCEDURE — 76819 FETAL BIOPHYS PROFIL W/O NST: CPT

## 2024-08-29 PROCEDURE — 76819 FETAL BIOPHYS PROFIL W/O NST: CPT | Mod: 26 | Performed by: OBSTETRICS & GYNECOLOGY

## 2024-08-29 NOTE — PROGRESS NOTES
32w4d   Feeling really well. No itching. Taking ursodiol. Last bile acid was 12.   Baby is active.  Last growth US was last week.  Cephalic with normal fluid and growth at 51%.   Plan per MFM is delivery at 39 wks due to ICP well controlled.    Wondering again about TOLAC vs repeat  section.   discussed plan to deliver at 39 wks, if labor occurs prior could try to labor but if no ctx's and cervix not favorable, would be likely be easier for her to go with repeat  section then risk of attempt IOL with prior  section.   had a lot of questions but seemed content with that plan.  They do not plan to have anymore children.  So not looking at multiple  sections.  RR

## 2024-08-29 NOTE — PROGRESS NOTES
Please refer to ultrasound report under 'Imaging' Studies of 'Chart Review' tabs.    Danial Cerda M.D.

## 2024-09-03 ENCOUNTER — HOSPITAL ENCOUNTER (OUTPATIENT)
Dept: ULTRASOUND IMAGING | Facility: CLINIC | Age: 34
Discharge: HOME | End: 2024-09-03
Attending: STUDENT IN AN ORGANIZED HEALTH CARE EDUCATION/TRAINING PROGRAM
Payer: COMMERCIAL

## 2024-09-03 ENCOUNTER — OFFICE VISIT (OUTPATIENT)
Dept: MATERNAL FETAL MEDICINE | Facility: CLINIC | Age: 34
End: 2024-09-03
Attending: STUDENT IN AN ORGANIZED HEALTH CARE EDUCATION/TRAINING PROGRAM
Payer: COMMERCIAL

## 2024-09-03 DIAGNOSIS — O26.643 CHOLESTASIS OF PREGNANCY, THIRD TRIMESTER: Primary | ICD-10-CM

## 2024-09-03 DIAGNOSIS — O26.642 CHOLESTASIS DURING PREGNANCY IN SECOND TRIMESTER: ICD-10-CM

## 2024-09-03 PROCEDURE — 76819 FETAL BIOPHYS PROFIL W/O NST: CPT

## 2024-09-03 PROCEDURE — 76819 FETAL BIOPHYS PROFIL W/O NST: CPT | Mod: 26 | Performed by: OBSTETRICS & GYNECOLOGY

## 2024-09-03 NOTE — NURSING NOTE
Patient reports positive fetal movement, denies pain, denies contractions, leaking of fluid, or bleeding. States that itching is better  Education provided to patient on US/BPP.  SBAR given to BENI MCKNIGHT, see their note in Epic.

## 2024-09-06 ENCOUNTER — OFFICE VISIT (OUTPATIENT)
Dept: MATERNAL FETAL MEDICINE | Facility: CLINIC | Age: 34
End: 2024-09-06
Attending: OBSTETRICS & GYNECOLOGY
Payer: COMMERCIAL

## 2024-09-06 ENCOUNTER — HOSPITAL ENCOUNTER (OUTPATIENT)
Dept: ULTRASOUND IMAGING | Facility: CLINIC | Age: 34
Discharge: HOME | End: 2024-09-06
Attending: OBSTETRICS & GYNECOLOGY
Payer: COMMERCIAL

## 2024-09-06 DIAGNOSIS — O26.642 CHOLESTASIS DURING PREGNANCY IN SECOND TRIMESTER: ICD-10-CM

## 2024-09-06 DIAGNOSIS — O26.643 CHOLESTASIS OF PREGNANCY, THIRD TRIMESTER: Primary | ICD-10-CM

## 2024-09-06 PROCEDURE — 76816 OB US FOLLOW-UP PER FETUS: CPT | Mod: 26 | Performed by: OBSTETRICS & GYNECOLOGY

## 2024-09-06 PROCEDURE — 76819 FETAL BIOPHYS PROFIL W/O NST: CPT

## 2024-09-06 PROCEDURE — 76819 FETAL BIOPHYS PROFIL W/O NST: CPT | Mod: 26 | Performed by: OBSTETRICS & GYNECOLOGY

## 2024-09-06 PROCEDURE — 76816 OB US FOLLOW-UP PER FETUS: CPT

## 2024-09-06 NOTE — PROGRESS NOTES
"Please see \"Imaging\" tab under \"Chart Review\" for details of today's US at the [facility].    Fredis Irvin MD  Maternal-Fetal Medicine    "

## 2024-09-06 NOTE — NURSING NOTE
Patient reports positive fetal movement, no pain, no contractions, leaking of fluid, or bleeding.  Reports itching is almost gone with ursodiol TID.  SBAR given to BENI MCKNIGHT, see their note in Epic.

## 2024-09-10 ENCOUNTER — OFFICE VISIT (OUTPATIENT)
Dept: MATERNAL FETAL MEDICINE | Facility: CLINIC | Age: 34
End: 2024-09-10
Attending: STUDENT IN AN ORGANIZED HEALTH CARE EDUCATION/TRAINING PROGRAM
Payer: COMMERCIAL

## 2024-09-10 ENCOUNTER — HOSPITAL ENCOUNTER (OUTPATIENT)
Dept: ULTRASOUND IMAGING | Facility: CLINIC | Age: 34
Discharge: HOME | End: 2024-09-10
Attending: STUDENT IN AN ORGANIZED HEALTH CARE EDUCATION/TRAINING PROGRAM
Payer: COMMERCIAL

## 2024-09-10 DIAGNOSIS — O26.643 CHOLESTASIS OF PREGNANCY, THIRD TRIMESTER: Primary | ICD-10-CM

## 2024-09-10 DIAGNOSIS — O26.642 CHOLESTASIS DURING PREGNANCY IN SECOND TRIMESTER: ICD-10-CM

## 2024-09-10 PROCEDURE — 76819 FETAL BIOPHYS PROFIL W/O NST: CPT | Mod: 26 | Performed by: STUDENT IN AN ORGANIZED HEALTH CARE EDUCATION/TRAINING PROGRAM

## 2024-09-10 PROCEDURE — 76819 FETAL BIOPHYS PROFIL W/O NST: CPT

## 2024-09-10 NOTE — NURSING NOTE
Patient reports positive fetal movement, denies pain, denies contractions, leaking of fluid, or bleeding.  Reports itching is pretty much gone.  Education provided to patient on today's ultrasound.  SBAR given to BENI MCKNIGHT, see their note in Epic.

## 2024-09-12 ENCOUNTER — PRENATAL OFFICE VISIT (OUTPATIENT)
Dept: OBGYN | Facility: CLINIC | Age: 34
End: 2024-09-12
Payer: COMMERCIAL

## 2024-09-12 VITALS
SYSTOLIC BLOOD PRESSURE: 111 MMHG | HEART RATE: 102 BPM | BODY MASS INDEX: 29.7 KG/M2 | TEMPERATURE: 97.5 F | DIASTOLIC BLOOD PRESSURE: 67 MMHG | WEIGHT: 173 LBS

## 2024-09-12 DIAGNOSIS — O09.90 HIGH-RISK PREGNANCY, UNSPECIFIED TRIMESTER: Primary | ICD-10-CM

## 2024-09-12 DIAGNOSIS — O26.642 CHOLESTASIS DURING PREGNANCY IN SECOND TRIMESTER: ICD-10-CM

## 2024-09-12 DIAGNOSIS — Z98.891 HISTORY OF C-SECTION: ICD-10-CM

## 2024-09-12 PROCEDURE — 99207 PR PRENATAL VISIT: CPT | Performed by: OBSTETRICS & GYNECOLOGY

## 2024-09-12 NOTE — PROGRESS NOTES
34w4d   Feeling well.  Baby is active. No ctx's. Feels back pain at times.   Declined a support belt.  Growth US last week showed 70%, cephalic and normal fluid.   discussed RSV vaccine - they declined   We discussed tubal sterilization at length.  They are planning this will be the last pregnancy but decline a permanent procedure.  Plan GBS at [place].  RR

## 2024-09-13 ENCOUNTER — OFFICE VISIT (OUTPATIENT)
Dept: MATERNAL FETAL MEDICINE | Facility: CLINIC | Age: 34
End: 2024-09-13
Attending: OBSTETRICS & GYNECOLOGY
Payer: COMMERCIAL

## 2024-09-13 ENCOUNTER — HOSPITAL ENCOUNTER (OUTPATIENT)
Dept: ULTRASOUND IMAGING | Facility: CLINIC | Age: 34
Discharge: HOME | End: 2024-09-13
Attending: OBSTETRICS & GYNECOLOGY
Payer: COMMERCIAL

## 2024-09-13 DIAGNOSIS — O26.643 CHOLESTASIS OF PREGNANCY, THIRD TRIMESTER: Primary | ICD-10-CM

## 2024-09-13 DIAGNOSIS — O26.642 CHOLESTASIS DURING PREGNANCY IN SECOND TRIMESTER: ICD-10-CM

## 2024-09-13 PROCEDURE — 76819 FETAL BIOPHYS PROFIL W/O NST: CPT | Mod: 26 | Performed by: OBSTETRICS & GYNECOLOGY

## 2024-09-13 PROCEDURE — 76819 FETAL BIOPHYS PROFIL W/O NST: CPT

## 2024-09-13 NOTE — NURSING NOTE
Patient reports positie fetal movement, denies pain,  contractions, leaking of fluid, or bleeding. She reports no itching in the past couple weeks.  Education provided to patient on US.  SBAR given to BENI MCKNIGHT, see their note in Epic.

## 2024-09-16 ENCOUNTER — OFFICE VISIT (OUTPATIENT)
Dept: MATERNAL FETAL MEDICINE | Facility: CLINIC | Age: 34
End: 2024-09-16
Attending: OBSTETRICS & GYNECOLOGY
Payer: COMMERCIAL

## 2024-09-16 ENCOUNTER — HOSPITAL ENCOUNTER (OUTPATIENT)
Dept: ULTRASOUND IMAGING | Facility: CLINIC | Age: 34
Discharge: HOME | End: 2024-09-16
Attending: OBSTETRICS & GYNECOLOGY
Payer: COMMERCIAL

## 2024-09-16 DIAGNOSIS — O26.643 INTRAHEPATIC CHOLESTASIS OF PREGNANCY IN THIRD TRIMESTER: Primary | ICD-10-CM

## 2024-09-16 DIAGNOSIS — O26.643 CHOLESTASIS OF PREGNANCY, THIRD TRIMESTER: ICD-10-CM

## 2024-09-16 PROCEDURE — 76819 FETAL BIOPHYS PROFIL W/O NST: CPT | Mod: 26 | Performed by: OBSTETRICS & GYNECOLOGY

## 2024-09-16 PROCEDURE — 76819 FETAL BIOPHYS PROFIL W/O NST: CPT

## 2024-09-16 NOTE — NURSING NOTE
Patient reports positive fetal movement, reports irregular contractions, denies leaking of fluid, or bleeding.  Education provided to patient on today's ultrasound.  SBAR given to BENI MCKNIGHT, see their note in Epic.

## 2024-09-16 NOTE — PROGRESS NOTES
Please see full imaging report from [employer] program under imaging tab.    Transverse lie.     Lucy Sher MD  Maternal Fetal Medicine

## 2024-09-20 ENCOUNTER — HOSPITAL ENCOUNTER (OUTPATIENT)
Dept: ULTRASOUND IMAGING | Facility: CLINIC | Age: 34
Discharge: HOME | End: 2024-09-20
Attending: STUDENT IN AN ORGANIZED HEALTH CARE EDUCATION/TRAINING PROGRAM | Admitting: STUDENT IN AN ORGANIZED HEALTH CARE EDUCATION/TRAINING PROGRAM
Payer: COMMERCIAL

## 2024-09-20 ENCOUNTER — OFFICE VISIT (OUTPATIENT)
Dept: MATERNAL FETAL MEDICINE | Facility: CLINIC | Age: 34
End: 2024-09-20
Attending: STUDENT IN AN ORGANIZED HEALTH CARE EDUCATION/TRAINING PROGRAM
Payer: COMMERCIAL

## 2024-09-20 DIAGNOSIS — O26.643 INTRAHEPATIC CHOLESTASIS OF PREGNANCY IN THIRD TRIMESTER: Primary | ICD-10-CM

## 2024-09-20 DIAGNOSIS — O26.643 CHOLESTASIS OF PREGNANCY, THIRD TRIMESTER: ICD-10-CM

## 2024-09-20 PROCEDURE — 76819 FETAL BIOPHYS PROFIL W/O NST: CPT | Mod: 26 | Performed by: STUDENT IN AN ORGANIZED HEALTH CARE EDUCATION/TRAINING PROGRAM

## 2024-09-20 PROCEDURE — 76819 FETAL BIOPHYS PROFIL W/O NST: CPT

## 2024-09-20 NOTE — NURSING NOTE
Patient reports positive fetal movement, denies concerns today. Has not had itching in weeks.  Education provided to patient on today's ultrasound.  SBAR given to BENI MCKNIGHT, see their note in Epic.

## 2024-09-24 ENCOUNTER — PRENATAL OFFICE VISIT (OUTPATIENT)
Dept: OBGYN | Facility: CLINIC | Age: 34
End: 2024-09-24
Payer: COMMERCIAL

## 2024-09-24 VITALS
WEIGHT: 175 LBS | TEMPERATURE: 97.4 F | HEART RATE: 86 BPM | BODY MASS INDEX: 30.04 KG/M2 | DIASTOLIC BLOOD PRESSURE: 68 MMHG | SYSTOLIC BLOOD PRESSURE: 104 MMHG

## 2024-09-24 DIAGNOSIS — O09.90 HIGH-RISK PREGNANCY, UNSPECIFIED TRIMESTER: Primary | ICD-10-CM

## 2024-09-24 DIAGNOSIS — O99.011 ANEMIA DURING PREGNANCY IN FIRST TRIMESTER: ICD-10-CM

## 2024-09-24 DIAGNOSIS — O26.642 CHOLESTASIS DURING PREGNANCY IN SECOND TRIMESTER: ICD-10-CM

## 2024-09-24 LAB
ERYTHROCYTE [DISTWIDTH] IN BLOOD BY AUTOMATED COUNT: 15.9 % (ref 10–15)
HCT VFR BLD AUTO: 34.1 % (ref 35–47)
HGB BLD-MCNC: 10.9 G/DL (ref 11.7–15.7)
HOLD SPECIMEN: NORMAL
MCH RBC QN AUTO: 25.8 PG (ref 26.5–33)
MCHC RBC AUTO-ENTMCNC: 32 G/DL (ref 31.5–36.5)
MCV RBC AUTO: 81 FL (ref 78–100)
PLATELET # BLD AUTO: 209 10E3/UL (ref 150–450)
RBC # BLD AUTO: 4.23 10E6/UL (ref 3.8–5.2)
WBC # BLD AUTO: 7.8 10E3/UL (ref 4–11)

## 2024-09-24 PROCEDURE — 84460 ALANINE AMINO (ALT) (SGPT): CPT | Performed by: OBSTETRICS & GYNECOLOGY

## 2024-09-24 PROCEDURE — 99207 PR PRENATAL VISIT: CPT | Performed by: OBSTETRICS & GYNECOLOGY

## 2024-09-24 PROCEDURE — 87653 STREP B DNA AMP PROBE: CPT | Performed by: OBSTETRICS & GYNECOLOGY

## 2024-09-24 PROCEDURE — 83550 IRON BINDING TEST: CPT | Performed by: OBSTETRICS & GYNECOLOGY

## 2024-09-24 PROCEDURE — 82239 BILE ACIDS TOTAL: CPT | Mod: 90 | Performed by: OBSTETRICS & GYNECOLOGY

## 2024-09-24 PROCEDURE — 84450 TRANSFERASE (AST) (SGOT): CPT | Performed by: OBSTETRICS & GYNECOLOGY

## 2024-09-24 PROCEDURE — 99000 SPECIMEN HANDLING OFFICE-LAB: CPT | Performed by: OBSTETRICS & GYNECOLOGY

## 2024-09-24 PROCEDURE — 83540 ASSAY OF IRON: CPT | Performed by: OBSTETRICS & GYNECOLOGY

## 2024-09-24 PROCEDURE — 82728 ASSAY OF FERRITIN: CPT | Performed by: OBSTETRICS & GYNECOLOGY

## 2024-09-24 PROCEDURE — 85027 COMPLETE CBC AUTOMATED: CPT | Performed by: OBSTETRICS & GYNECOLOGY

## 2024-09-24 PROCEDURE — 36415 COLL VENOUS BLD VENIPUNCTURE: CPT | Performed by: OBSTETRICS & GYNECOLOGY

## 2024-09-24 RX ORDER — ACETAMINOPHEN 325 MG/1
650 TABLET ORAL EVERY 6 HOURS PRN
Qty: 100 TABLET | Refills: 0 | Status: SHIPPED | OUTPATIENT
Start: 2024-09-24

## 2024-09-24 RX ORDER — IBUPROFEN 600 MG/1
600 TABLET, FILM COATED ORAL EVERY 6 HOURS PRN
Qty: 60 TABLET | Refills: 0 | Status: SHIPPED | OUTPATIENT
Start: 2024-09-24

## 2024-09-24 RX ORDER — AMOXICILLIN 250 MG
1 CAPSULE ORAL DAILY
Qty: 100 TABLET | Refills: 0 | Status: SHIPPED | OUTPATIENT
Start: 2024-09-24

## 2024-09-24 NOTE — PROGRESS NOTES
36w2d   Overall doing well, but getting more tired.  Can't stand for too long. No ctx's.  Not itching, taking urosdiol.  Baby is active.  Normal BPP'S.   Baby was transverse on last US but today feels cephalic.   GBS and labs today including bile acids, ALT and AST.   Postpartum meds sent to pharmacy today.  RR

## 2024-09-25 LAB
ALT SERPL W P-5'-P-CCNC: 40 U/L (ref 0–50)
AST SERPL W P-5'-P-CCNC: 32 U/L (ref 0–45)
FERRITIN SERPL-MCNC: 31 NG/ML (ref 6–175)
IRON BINDING CAPACITY (ROCHE): 458 UG/DL (ref 240–430)
IRON SATN MFR SERPL: 34 % (ref 15–46)
IRON SERPL-MCNC: 157 UG/DL (ref 37–145)

## 2024-09-26 ENCOUNTER — OFFICE VISIT (OUTPATIENT)
Dept: MATERNAL FETAL MEDICINE | Facility: CLINIC | Age: 34
End: 2024-09-26
Attending: OBSTETRICS & GYNECOLOGY
Payer: COMMERCIAL

## 2024-09-26 ENCOUNTER — HOSPITAL ENCOUNTER (OUTPATIENT)
Dept: ULTRASOUND IMAGING | Facility: CLINIC | Age: 34
Discharge: HOME | End: 2024-09-26
Attending: OBSTETRICS & GYNECOLOGY | Admitting: OBSTETRICS & GYNECOLOGY
Payer: COMMERCIAL

## 2024-09-26 DIAGNOSIS — O26.643 CHOLESTASIS OF PREGNANCY, THIRD TRIMESTER: Primary | ICD-10-CM

## 2024-09-26 DIAGNOSIS — O26.643 CHOLESTASIS OF PREGNANCY, THIRD TRIMESTER: ICD-10-CM

## 2024-09-26 LAB — GP B STREP DNA SPEC QL NAA+PROBE: NEGATIVE

## 2024-09-26 PROCEDURE — 76819 FETAL BIOPHYS PROFIL W/O NST: CPT | Mod: 26 | Performed by: OBSTETRICS & GYNECOLOGY

## 2024-09-26 PROCEDURE — 76819 FETAL BIOPHYS PROFIL W/O NST: CPT

## 2024-09-26 NOTE — NURSING NOTE
Patient reports positive fetal movement, denies contractions, leaking of fluid, or bleeding.  Education provided to patient on today's ultrasound.  SBAR given to BENI MCKNIGHT, see their note in Epic.

## 2024-09-26 NOTE — PROGRESS NOTES
"Please see \"Imaging\" tab under \"Chart Review\" for details of today's US at the [facility].    Fredis Irvin MD  Maternal-Fetal Medicine    "

## 2024-09-27 LAB — BILE AC SERPL-SCNC: 23 UMOL/L

## 2024-09-30 ENCOUNTER — OFFICE VISIT (OUTPATIENT)
Dept: MATERNAL FETAL MEDICINE | Facility: CLINIC | Age: 34
End: 2024-09-30
Attending: OBSTETRICS & GYNECOLOGY
Payer: COMMERCIAL

## 2024-09-30 ENCOUNTER — HOSPITAL ENCOUNTER (OUTPATIENT)
Dept: ULTRASOUND IMAGING | Facility: CLINIC | Age: 34
Discharge: HOME | End: 2024-09-30
Attending: OBSTETRICS & GYNECOLOGY
Payer: COMMERCIAL

## 2024-09-30 DIAGNOSIS — O26.643 CHOLESTASIS OF PREGNANCY, THIRD TRIMESTER: ICD-10-CM

## 2024-09-30 DIAGNOSIS — O26.643 INTRAHEPATIC CHOLESTASIS OF PREGNANCY IN THIRD TRIMESTER, ANTEPARTUM: Primary | ICD-10-CM

## 2024-09-30 PROCEDURE — 76819 FETAL BIOPHYS PROFIL W/O NST: CPT | Mod: 26 | Performed by: OBSTETRICS & GYNECOLOGY

## 2024-09-30 PROCEDURE — 76819 FETAL BIOPHYS PROFIL W/O NST: CPT

## 2024-09-30 NOTE — NURSING NOTE
Patient reports positive fetal movement, no pain, no contractions, leaking of fluid, or bleeding.  Education provided to patient on BPP and growth/BPP on friday.  SBAR given to BENI MCKNIGHT, see their note in Epic.

## 2024-09-30 NOTE — PROGRESS NOTES
Please see full imaging report from [employer] program under imaging tab.    Lucy Sher MD  Maternal Fetal Medicine

## 2024-10-01 ENCOUNTER — MYC REFILL (OUTPATIENT)
Dept: OBGYN | Facility: CLINIC | Age: 34
End: 2024-10-01

## 2024-10-01 ENCOUNTER — PRENATAL OFFICE VISIT (OUTPATIENT)
Dept: OBGYN | Facility: CLINIC | Age: 34
End: 2024-10-01
Payer: COMMERCIAL

## 2024-10-01 VITALS
BODY MASS INDEX: 29.7 KG/M2 | WEIGHT: 173 LBS | SYSTOLIC BLOOD PRESSURE: 101 MMHG | HEART RATE: 94 BPM | DIASTOLIC BLOOD PRESSURE: 67 MMHG | TEMPERATURE: 97.1 F

## 2024-10-01 DIAGNOSIS — O26.642 CHOLESTASIS DURING PREGNANCY IN SECOND TRIMESTER: ICD-10-CM

## 2024-10-01 DIAGNOSIS — Z98.891 HISTORY OF C-SECTION: ICD-10-CM

## 2024-10-01 DIAGNOSIS — O09.90 HIGH-RISK PREGNANCY, UNSPECIFIED TRIMESTER: Primary | ICD-10-CM

## 2024-10-01 PROCEDURE — 99207 PR PRENATAL VISIT: CPT | Performed by: OBSTETRICS & GYNECOLOGY

## 2024-10-01 NOTE — Clinical Note
Hi team~  I need to move this patient's  section up to Wednesday 10/9 when I am on call.  I see the 10:30 space is open. Her bile acid level is rising again so need to move it up. She is aware and fine with the plan. I told her someone would call and confirm the change.   Thanks,  Please call me if there is any problem.   RR

## 2024-10-01 NOTE — PROGRESS NOTES
37w2d  No complaints.  Baby is active.  She is feeling well.   Reviewed labs from last week.  Bile acids are higher than last month.   She is not feeling symptoms and is taking ursodiol.   Recommended moving  section up to next week patient agreeable.   GBS negative.  RR

## 2024-10-02 RX ORDER — URSODIOL 300 MG/1
300 CAPSULE ORAL 3 TIMES DAILY
Qty: 270 CAPSULE | Refills: 2 | OUTPATIENT
Start: 2024-10-02

## 2024-10-02 NOTE — TELEPHONE ENCOUNTER
RESCHEDULE TO 10/09/24 AT 10:30AM PER ROMERO (Katey removed as assist).     Joana Brewer/her/hers  [facility]

## 2024-10-04 ENCOUNTER — ANCILLARY PROCEDURE (OUTPATIENT)
Dept: ULTRASOUND IMAGING | Facility: HOSPITAL | Age: 34
End: 2024-10-04
Attending: OBSTETRICS & GYNECOLOGY
Payer: COMMERCIAL

## 2024-10-04 ENCOUNTER — OFFICE VISIT (OUTPATIENT)
Dept: MATERNAL FETAL MEDICINE | Facility: HOSPITAL | Age: 34
End: 2024-10-04
Attending: OBSTETRICS & GYNECOLOGY
Payer: COMMERCIAL

## 2024-10-04 DIAGNOSIS — O26.643 CHOLESTASIS OF PREGNANCY, THIRD TRIMESTER: ICD-10-CM

## 2024-10-04 DIAGNOSIS — O26.643 INTRAHEPATIC CHOLESTASIS OF PREGNANCY IN THIRD TRIMESTER, ANTEPARTUM: Primary | ICD-10-CM

## 2024-10-04 PROCEDURE — 99213 OFFICE O/P EST LOW 20 MIN: CPT | Mod: 25 | Performed by: STUDENT IN AN ORGANIZED HEALTH CARE EDUCATION/TRAINING PROGRAM

## 2024-10-04 PROCEDURE — 76816 OB US FOLLOW-UP PER FETUS: CPT | Mod: 26 | Performed by: STUDENT IN AN ORGANIZED HEALTH CARE EDUCATION/TRAINING PROGRAM

## 2024-10-04 PROCEDURE — 76818 FETAL BIOPHYS PROFILE W/NST: CPT

## 2024-10-04 PROCEDURE — 76818 FETAL BIOPHYS PROFILE W/NST: CPT | Mod: 26 | Performed by: STUDENT IN AN ORGANIZED HEALTH CARE EDUCATION/TRAINING PROGRAM

## 2024-10-04 NOTE — NURSING NOTE
Jennifer Felipe is a  at 37w5d who presents to [facility] for a follow-up growth and BPP. Pt reports positive fetal movement. Pt denies bldg/lof/change in discharge, contractions, headache, vision changes, chest pain/SOB or edema. SBAR given to Dr. Pastrana, see note in Epic.        NST Performed due to BPP .  Dr. Pastrana reviewed efm tracing. See NST/BPP Doc Flowsheet tab.     Yary Lewis RN

## 2024-10-04 NOTE — PROGRESS NOTES
The patient was seen for an ultrasound in the Maternal-Fetal Medicine Center today.  For a detailed report of the ultrasound examination, please see the ultrasound report which can be found under the imaging tab.    If you have questions regarding today's evaluation or if we can be of further service, please contact the Maternal-Fetal Medicine Center.    Piedad Pastrana MD  , OB/GYN  Maternal-Fetal Medicine

## 2024-10-06 LAB
ABO/RH(D): NORMAL
ANTIBODY SCREEN: NEGATIVE
SPECIMEN EXPIRATION DATE: NORMAL

## 2024-10-07 ENCOUNTER — LAB (OUTPATIENT)
Dept: LAB | Facility: CLINIC | Age: 34
End: 2024-10-07
Payer: COMMERCIAL

## 2024-10-07 DIAGNOSIS — Z01.818 PRE-OP EXAM: ICD-10-CM

## 2024-10-07 DIAGNOSIS — O26.642 CHOLESTASIS DURING PREGNANCY IN SECOND TRIMESTER: ICD-10-CM

## 2024-10-07 LAB
ERYTHROCYTE [DISTWIDTH] IN BLOOD BY AUTOMATED COUNT: 15.6 % (ref 10–15)
HCT VFR BLD AUTO: 34.3 % (ref 35–47)
HGB BLD-MCNC: 11.3 G/DL (ref 11.7–15.7)
MCH RBC QN AUTO: 26.7 PG (ref 26.5–33)
MCHC RBC AUTO-ENTMCNC: 32.9 G/DL (ref 31.5–36.5)
MCV RBC AUTO: 81 FL (ref 78–100)
PLATELET # BLD AUTO: 192 10E3/UL (ref 150–450)
RBC # BLD AUTO: 4.24 10E6/UL (ref 3.8–5.2)
WBC # BLD AUTO: 6.8 10E3/UL (ref 4–11)

## 2024-10-07 PROCEDURE — 86901 BLOOD TYPING SEROLOGIC RH(D): CPT

## 2024-10-07 PROCEDURE — 85027 COMPLETE CBC AUTOMATED: CPT

## 2024-10-07 PROCEDURE — 36415 COLL VENOUS BLD VENIPUNCTURE: CPT

## 2024-10-07 PROCEDURE — 86780 TREPONEMA PALLIDUM: CPT

## 2024-10-07 PROCEDURE — 86850 RBC ANTIBODY SCREEN: CPT

## 2024-10-07 PROCEDURE — 86900 BLOOD TYPING SEROLOGIC ABO: CPT

## 2024-10-08 ENCOUNTER — HOSPITAL ENCOUNTER (OUTPATIENT)
Dept: ULTRASOUND IMAGING | Facility: CLINIC | Age: 34
Discharge: HOME | End: 2024-10-08
Attending: OBSTETRICS & GYNECOLOGY
Payer: COMMERCIAL

## 2024-10-08 ENCOUNTER — ANESTHESIA EVENT (OUTPATIENT)
Dept: OBGYN | Facility: CLINIC | Age: 34
End: 2024-10-08
Payer: COMMERCIAL

## 2024-10-08 ENCOUNTER — OFFICE VISIT (OUTPATIENT)
Dept: MATERNAL FETAL MEDICINE | Facility: CLINIC | Age: 34
End: 2024-10-08
Attending: OBSTETRICS & GYNECOLOGY
Payer: COMMERCIAL

## 2024-10-08 DIAGNOSIS — O26.643 CHOLESTASIS OF PREGNANCY, THIRD TRIMESTER: ICD-10-CM

## 2024-10-08 DIAGNOSIS — O26.643 INTRAHEPATIC CHOLESTASIS OF PREGNANCY IN THIRD TRIMESTER, ANTEPARTUM: Primary | ICD-10-CM

## 2024-10-08 LAB — T PALLIDUM AB SER QL: NONREACTIVE

## 2024-10-08 PROCEDURE — 76819 FETAL BIOPHYS PROFIL W/O NST: CPT | Mod: 26 | Performed by: OBSTETRICS & GYNECOLOGY

## 2024-10-08 PROCEDURE — 76819 FETAL BIOPHYS PROFIL W/O NST: CPT

## 2024-10-08 RX ORDER — OXYTOCIN/0.9 % SODIUM CHLORIDE 30/500 ML
100-340 PLASTIC BAG, INJECTION (ML) INTRAVENOUS CONTINUOUS PRN
Status: CANCELLED | OUTPATIENT
Start: 2024-10-14

## 2024-10-08 RX ORDER — OXYTOCIN 10 [USP'U]/ML
10 INJECTION, SOLUTION INTRAMUSCULAR; INTRAVENOUS
Status: CANCELLED | OUTPATIENT
Start: 2024-10-14

## 2024-10-08 NOTE — PROGRESS NOTES
"Please see \"Imaging\" tab under \"Chart Review\" for details of today's US at the [facility].    Fredis Irvin MD  Maternal-Fetal Medicine    "

## 2024-10-08 NOTE — NURSING NOTE
Patient reports positive fetal movement, states she's having some contractions, denies leaking of fluid, or bleeding.   Education provided to patient on today's ultrasound and encouraged patient to call nurse line if contractions get more frequent or she thinks she's in labor before her scheduled c/s tomorrow.  SBAR given to BENI MCKNIGHT, see their note in Epic.

## 2024-10-08 NOTE — ANESTHESIA PREPROCEDURE EVALUATION
Anesthesia Pre-Procedure Evaluation    Patient: Jennifer Felipe   MRN: [id number] : 1990        Procedure : Procedure(s):  REPEAT  SECTION          Past Medical History:   Diagnosis Date    Cholestasis during pregnancy       Past Surgical History:   Procedure Laterality Date     SECTION      in [place].  Due to fetal distress during induction. pt reports cervix not dilating      Allergies   Allergen Reactions    Penicillin G Angioedema, Nausea and Vomiting, Hives and Itching     24 Dr. Willie Gooden: Drug allergy testing completed. +/++ to penicillin G and + to ampicillin. No reaction to cefazolin. Avoid ALL penicillin antibiotics (including Benzylpenicilloyl-poly-Lysin), but cephalosporins can be used. Penem antibiotics have not been tested.      Social History     Tobacco Use    Smoking status: Never    Smokeless tobacco: Never   Substance Use Topics    Alcohol use: Not Currently      Wt Readings from Last 1 Encounters:   10/01/24 78.5 kg (173 lb)        Anesthesia Evaluation   Pt has had prior anesthetic.         ROS/MED HX  ENT/Pulmonary:  - neg pulmonary ROS     Neurologic:  - neg neurologic ROS     Cardiovascular:  - neg cardiovascular ROS     METS/Exercise Tolerance:     Hematologic:  - neg hematologic  ROS     Musculoskeletal:  - neg musculoskeletal ROS     GI/Hepatic:     (+)             liver disease (ICP),       Renal/Genitourinary:       Endo:  - neg endo ROS     Psychiatric/Substance Use:  - neg psychiatric ROS     Infectious Disease:  - neg infectious disease ROS     Malignancy:  - neg malignancy ROS     Other: Comment: C/b ICP      (+) Possibly pregnant, , ,previous             OUTSIDE LABS:  CBC:   Lab Results   Component Value Date    WBC 6.8 10/07/2024    WBC 7.8 2024    HGB 11.3 (L) 10/07/2024    HGB 10.9 (L) 2024    HCT 34.3 (L) 10/07/2024    HCT 34.1 (L) 2024     10/07/2024     2024     BMP:   Lab Results   Component Value  "Date     (L) 11/22/2022    POTASSIUM 4.1 11/22/2022    CHLORIDE 101 11/22/2022    CO2 22 11/22/2022    BUN 17.9 11/22/2022    CR 0.64 11/22/2022    GLC 86 11/22/2022     COAGS: No results found for: \"PTT\", \"INR\", \"FIBR\"  POC: No results found for: \"BGM\", \"HCG\", \"HCGS\"  HEPATIC:   Lab Results   Component Value Date    ALBUMIN 3.2 (L) 07/12/2024    PROTTOTAL 6.8 07/12/2024    ALT 40 09/24/2024    AST 32 09/24/2024    ALKPHOS 240 (H) 07/12/2024    BILITOTAL 0.5 07/12/2024     OTHER:   Lab Results   Component Value Date    LINDA 8.9 11/22/2022    TSH 2.00 07/02/2024       Anesthesia Plan    ASA Status:  3       Anesthesia Type: Spinal.              Consents    Anesthesia Plan(s) and associated risks, benefits, and realistic alternatives discussed. Questions answered and patient/representative(s) expressed understanding.     - Discussed:     - Discussed with:  Patient            Postoperative Care            Comments:               Erika Williamson DO    I have reviewed the pertinent notes and labs in the chart from the past 30 days and (re)examined the patient.  Any updates or changes from those notes are reflected in this note.                                "

## 2024-10-09 ENCOUNTER — ANESTHESIA (OUTPATIENT)
Dept: OBGYN | Facility: CLINIC | Age: 34
End: 2024-10-09
Payer: COMMERCIAL

## 2024-10-09 ENCOUNTER — HOSPITAL ENCOUNTER (INPATIENT)
Facility: CLINIC | Age: 34
LOS: 2 days | Discharge: HOME | End: 2024-10-11
Attending: OBSTETRICS & GYNECOLOGY | Admitting: OBSTETRICS & GYNECOLOGY
Payer: COMMERCIAL

## 2024-10-09 DIAGNOSIS — O99.011 ANEMIA DURING PREGNANCY IN FIRST TRIMESTER: ICD-10-CM

## 2024-10-09 DIAGNOSIS — O09.90 HIGH-RISK PREGNANCY, UNSPECIFIED TRIMESTER: ICD-10-CM

## 2024-10-09 DIAGNOSIS — Z98.891 S/P CESAREAN SECTION: ICD-10-CM

## 2024-10-09 LAB
APTT PPP: 27 SECONDS (ref 22–38)
FIBRINOGEN PPP-MCNC: 590 MG/DL (ref 170–510)
HOLD SPECIMEN: NORMAL
INR PPP: 0.93 (ref 0.85–1.15)

## 2024-10-09 PROCEDURE — 85610 PROTHROMBIN TIME: CPT | Performed by: ANESTHESIOLOGY

## 2024-10-09 PROCEDURE — 271N000001 HC OR GENERAL SUPPLY NON-STERILE: Performed by: OBSTETRICS & GYNECOLOGY

## 2024-10-09 PROCEDURE — 250N000011 HC RX IP 250 OP 636: Performed by: ANESTHESIOLOGY

## 2024-10-09 PROCEDURE — 272N000001 HC OR GENERAL SUPPLY STERILE: Performed by: OBSTETRICS & GYNECOLOGY

## 2024-10-09 PROCEDURE — 250N000013 HC RX MED GY IP 250 OP 250 PS 637

## 2024-10-09 PROCEDURE — 36415 COLL VENOUS BLD VENIPUNCTURE: CPT | Performed by: ANESTHESIOLOGY

## 2024-10-09 PROCEDURE — 999N000141 HC STATISTIC PRE-PROCEDURE NURSING ASSESSMENT: Performed by: OBSTETRICS & GYNECOLOGY

## 2024-10-09 PROCEDURE — 64488 TAP BLOCK BI INJECTION: CPT | Mod: 59 | Performed by: ANESTHESIOLOGY

## 2024-10-09 PROCEDURE — C9290 INJ, BUPIVACAINE LIPOSOME: HCPCS | Performed by: ANESTHESIOLOGY

## 2024-10-09 PROCEDURE — 250N000011 HC RX IP 250 OP 636

## 2024-10-09 PROCEDURE — 120N000003 HC R&B IMCU UMMC

## 2024-10-09 PROCEDURE — 370N000017 HC ANESTHESIA TECHNICAL FEE, PER MIN: Performed by: OBSTETRICS & GYNECOLOGY

## 2024-10-09 PROCEDURE — 59510 CESAREAN DELIVERY: CPT | Mod: GC | Performed by: OBSTETRICS & GYNECOLOGY

## 2024-10-09 PROCEDURE — 258N000003 HC RX IP 258 OP 636

## 2024-10-09 PROCEDURE — 250N000009 HC RX 250: Performed by: OBSTETRICS & GYNECOLOGY

## 2024-10-09 PROCEDURE — 85384 FIBRINOGEN ACTIVITY: CPT | Performed by: ANESTHESIOLOGY

## 2024-10-09 PROCEDURE — 0W3R7ZZ CONTROL BLEEDING IN GENITOURINARY TRACT, VIA NATURAL OR ARTIFICIAL OPENING: ICD-10-PCS | Performed by: OBSTETRICS & GYNECOLOGY

## 2024-10-09 PROCEDURE — 258N000003 HC RX IP 258 OP 636: Performed by: OBSTETRICS & GYNECOLOGY

## 2024-10-09 PROCEDURE — 250N000013 HC RX MED GY IP 250 OP 250 PS 637: Performed by: OBSTETRICS & GYNECOLOGY

## 2024-10-09 PROCEDURE — 250N000011 HC RX IP 250 OP 636: Performed by: OBSTETRICS & GYNECOLOGY

## 2024-10-09 PROCEDURE — 04QE0ZZ REPAIR RIGHT INTERNAL ILIAC ARTERY, OPEN APPROACH: ICD-10-PCS | Performed by: OBSTETRICS & GYNECOLOGY

## 2024-10-09 PROCEDURE — 360N000076 HC SURGERY LEVEL 3, PER MIN: Performed by: OBSTETRICS & GYNECOLOGY

## 2024-10-09 PROCEDURE — 710N000010 HC RECOVERY PHASE 1, LEVEL 2, PER MIN: Performed by: OBSTETRICS & GYNECOLOGY

## 2024-10-09 PROCEDURE — 59510 CESAREAN DELIVERY: CPT | Performed by: ANESTHESIOLOGY

## 2024-10-09 PROCEDURE — 85730 THROMBOPLASTIN TIME PARTIAL: CPT | Performed by: ANESTHESIOLOGY

## 2024-10-09 RX ORDER — OXYTOCIN/0.9 % SODIUM CHLORIDE 30/500 ML
340 PLASTIC BAG, INJECTION (ML) INTRAVENOUS CONTINUOUS PRN
Status: DISCONTINUED | OUTPATIENT
Start: 2024-10-09 | End: 2024-10-09

## 2024-10-09 RX ORDER — ONDANSETRON 2 MG/ML
4 INJECTION INTRAMUSCULAR; INTRAVENOUS EVERY 6 HOURS PRN
Status: DISCONTINUED | OUTPATIENT
Start: 2024-10-09 | End: 2024-10-11 | Stop reason: HOSPADM

## 2024-10-09 RX ORDER — MORPHINE SULFATE 1 MG/ML
INJECTION, SOLUTION EPIDURAL; INTRATHECAL; INTRAVENOUS
Status: COMPLETED | OUTPATIENT
Start: 2024-10-09 | End: 2024-10-09

## 2024-10-09 RX ORDER — ACETAMINOPHEN 325 MG/1
975 TABLET ORAL ONCE
Status: COMPLETED | OUTPATIENT
Start: 2024-10-09 | End: 2024-10-09

## 2024-10-09 RX ORDER — HYDROCORTISONE 25 MG/G
CREAM TOPICAL 3 TIMES DAILY PRN
Status: DISCONTINUED | OUTPATIENT
Start: 2024-10-09 | End: 2024-10-11 | Stop reason: HOSPADM

## 2024-10-09 RX ORDER — BISACODYL 10 MG
10 SUPPOSITORY, RECTAL RECTAL DAILY PRN
Status: DISCONTINUED | OUTPATIENT
Start: 2024-10-11 | End: 2024-10-11 | Stop reason: HOSPADM

## 2024-10-09 RX ORDER — CITRIC ACID/SODIUM CITRATE 334-500MG
15 SOLUTION, ORAL ORAL
Status: DISCONTINUED | OUTPATIENT
Start: 2024-10-09 | End: 2024-10-09

## 2024-10-09 RX ORDER — PROCHLORPERAZINE MALEATE 10 MG
10 TABLET ORAL EVERY 6 HOURS PRN
Status: DISCONTINUED | OUTPATIENT
Start: 2024-10-09 | End: 2024-10-11 | Stop reason: HOSPADM

## 2024-10-09 RX ORDER — CARBOPROST TROMETHAMINE 250 UG/ML
250 INJECTION, SOLUTION INTRAMUSCULAR
Status: DISCONTINUED | OUTPATIENT
Start: 2024-10-09 | End: 2024-10-11 | Stop reason: HOSPADM

## 2024-10-09 RX ORDER — ONDANSETRON 4 MG/1
4 TABLET, ORALLY DISINTEGRATING ORAL EVERY 6 HOURS PRN
Status: DISCONTINUED | OUTPATIENT
Start: 2024-10-09 | End: 2024-10-11 | Stop reason: HOSPADM

## 2024-10-09 RX ORDER — IBUPROFEN 800 MG/1
800 TABLET, FILM COATED ORAL EVERY 6 HOURS
Status: DISCONTINUED | OUTPATIENT
Start: 2024-10-10 | End: 2024-10-11 | Stop reason: HOSPADM

## 2024-10-09 RX ORDER — CEFAZOLIN SODIUM/WATER 2 G/20 ML
2 SYRINGE (ML) INTRAVENOUS
Status: DISCONTINUED | OUTPATIENT
Start: 2024-10-09 | End: 2024-10-09

## 2024-10-09 RX ORDER — MISOPROSTOL 200 UG/1
400 TABLET ORAL
Status: DISCONTINUED | OUTPATIENT
Start: 2024-10-09 | End: 2024-10-11 | Stop reason: HOSPADM

## 2024-10-09 RX ORDER — OXYCODONE HYDROCHLORIDE 5 MG/1
5 TABLET ORAL EVERY 4 HOURS PRN
Status: DISCONTINUED | OUTPATIENT
Start: 2024-10-09 | End: 2024-10-11 | Stop reason: HOSPADM

## 2024-10-09 RX ORDER — KETOROLAC TROMETHAMINE 30 MG/ML
30 INJECTION, SOLUTION INTRAMUSCULAR; INTRAVENOUS EVERY 6 HOURS
Status: COMPLETED | OUTPATIENT
Start: 2024-10-09 | End: 2024-10-10

## 2024-10-09 RX ORDER — AMOXICILLIN 250 MG
1 CAPSULE ORAL 2 TIMES DAILY
Status: DISCONTINUED | OUTPATIENT
Start: 2024-10-09 | End: 2024-10-11 | Stop reason: HOSPADM

## 2024-10-09 RX ORDER — OXYTOCIN 10 [USP'U]/ML
10 INJECTION, SOLUTION INTRAMUSCULAR; INTRAVENOUS
Status: DISCONTINUED | OUTPATIENT
Start: 2024-10-09 | End: 2024-10-11 | Stop reason: HOSPADM

## 2024-10-09 RX ORDER — SODIUM CHLORIDE, SODIUM LACTATE, POTASSIUM CHLORIDE, CALCIUM CHLORIDE 600; 310; 30; 20 MG/100ML; MG/100ML; MG/100ML; MG/100ML
INJECTION, SOLUTION INTRAVENOUS CONTINUOUS
Status: DISCONTINUED | OUTPATIENT
Start: 2024-10-09 | End: 2024-10-09

## 2024-10-09 RX ORDER — DEXTROSE, SODIUM CHLORIDE, SODIUM LACTATE, POTASSIUM CHLORIDE, AND CALCIUM CHLORIDE 5; .6; .31; .03; .02 G/100ML; G/100ML; G/100ML; G/100ML; G/100ML
INJECTION, SOLUTION INTRAVENOUS CONTINUOUS
Status: DISCONTINUED | OUTPATIENT
Start: 2024-10-09 | End: 2024-10-11

## 2024-10-09 RX ORDER — MISOPROSTOL 200 UG/1
400 TABLET ORAL
Status: DISCONTINUED | OUTPATIENT
Start: 2024-10-09 | End: 2024-10-09

## 2024-10-09 RX ORDER — ONDANSETRON 4 MG/1
4 TABLET, ORALLY DISINTEGRATING ORAL EVERY 30 MIN PRN
Status: DISCONTINUED | OUTPATIENT
Start: 2024-10-09 | End: 2024-10-09 | Stop reason: HOSPADM

## 2024-10-09 RX ORDER — HYDRALAZINE HYDROCHLORIDE 20 MG/ML
2.5-5 INJECTION INTRAMUSCULAR; INTRAVENOUS EVERY 10 MIN PRN
Status: DISCONTINUED | OUTPATIENT
Start: 2024-10-09 | End: 2024-10-09 | Stop reason: HOSPADM

## 2024-10-09 RX ORDER — ACETAMINOPHEN 325 MG/1
975 TABLET ORAL EVERY 6 HOURS
Status: DISCONTINUED | OUTPATIENT
Start: 2024-10-09 | End: 2024-10-11 | Stop reason: HOSPADM

## 2024-10-09 RX ORDER — MISOPROSTOL 200 UG/1
800 TABLET ORAL
Status: DISCONTINUED | OUTPATIENT
Start: 2024-10-09 | End: 2024-10-09

## 2024-10-09 RX ORDER — NALOXONE HYDROCHLORIDE 0.4 MG/ML
0.1 INJECTION, SOLUTION INTRAMUSCULAR; INTRAVENOUS; SUBCUTANEOUS
Status: DISCONTINUED | OUTPATIENT
Start: 2024-10-09 | End: 2024-10-09 | Stop reason: HOSPADM

## 2024-10-09 RX ORDER — LOPERAMIDE HYDROCHLORIDE 2 MG/1
2 CAPSULE ORAL
Status: DISCONTINUED | OUTPATIENT
Start: 2024-10-09 | End: 2024-10-11 | Stop reason: HOSPADM

## 2024-10-09 RX ORDER — LIDOCAINE 40 MG/G
CREAM TOPICAL
Status: DISCONTINUED | OUTPATIENT
Start: 2024-10-09 | End: 2024-10-09

## 2024-10-09 RX ORDER — CEFAZOLIN SODIUM/WATER 2 G/20 ML
2 SYRINGE (ML) INTRAVENOUS SEE ADMIN INSTRUCTIONS
Status: DISCONTINUED | OUTPATIENT
Start: 2024-10-09 | End: 2024-10-09

## 2024-10-09 RX ORDER — SIMETHICONE 80 MG
80 TABLET,CHEWABLE ORAL 4 TIMES DAILY PRN
Status: DISCONTINUED | OUTPATIENT
Start: 2024-10-09 | End: 2024-10-11 | Stop reason: HOSPADM

## 2024-10-09 RX ORDER — ONDANSETRON 2 MG/ML
INJECTION INTRAMUSCULAR; INTRAVENOUS PRN
Status: DISCONTINUED | OUTPATIENT
Start: 2024-10-09 | End: 2024-10-09

## 2024-10-09 RX ORDER — BUPIVACAINE HYDROCHLORIDE 2.5 MG/ML
INJECTION, SOLUTION EPIDURAL; INFILTRATION; INTRACAUDAL
Status: COMPLETED | OUTPATIENT
Start: 2024-10-09 | End: 2024-10-09

## 2024-10-09 RX ORDER — FENTANYL CITRATE 50 UG/ML
INJECTION, SOLUTION INTRAMUSCULAR; INTRAVENOUS
Status: COMPLETED | OUTPATIENT
Start: 2024-10-09 | End: 2024-10-09

## 2024-10-09 RX ORDER — DEXAMETHASONE SODIUM PHOSPHATE 4 MG/ML
4 INJECTION, SOLUTION INTRA-ARTICULAR; INTRALESIONAL; INTRAMUSCULAR; INTRAVENOUS; SOFT TISSUE
Status: DISCONTINUED | OUTPATIENT
Start: 2024-10-09 | End: 2024-10-09 | Stop reason: HOSPADM

## 2024-10-09 RX ORDER — METHYLERGONOVINE MALEATE 0.2 MG/ML
200 INJECTION INTRAVENOUS
Status: DISCONTINUED | OUTPATIENT
Start: 2024-10-09 | End: 2024-10-11 | Stop reason: HOSPADM

## 2024-10-09 RX ORDER — METHYLERGONOVINE MALEATE 0.2 MG/ML
200 INJECTION INTRAVENOUS
Status: DISCONTINUED | OUTPATIENT
Start: 2024-10-09 | End: 2024-10-09

## 2024-10-09 RX ORDER — LOPERAMIDE HYDROCHLORIDE 2 MG/1
4 CAPSULE ORAL
Status: DISCONTINUED | OUTPATIENT
Start: 2024-10-09 | End: 2024-10-09

## 2024-10-09 RX ORDER — LOPERAMIDE HYDROCHLORIDE 2 MG/1
2 CAPSULE ORAL
Status: DISCONTINUED | OUTPATIENT
Start: 2024-10-09 | End: 2024-10-09

## 2024-10-09 RX ORDER — METOCLOPRAMIDE 10 MG/1
10 TABLET ORAL EVERY 6 HOURS PRN
Status: DISCONTINUED | OUTPATIENT
Start: 2024-10-09 | End: 2024-10-11 | Stop reason: HOSPADM

## 2024-10-09 RX ORDER — TRANEXAMIC ACID 10 MG/ML
1 INJECTION, SOLUTION INTRAVENOUS EVERY 30 MIN PRN
Status: DISCONTINUED | OUTPATIENT
Start: 2024-10-09 | End: 2024-10-09

## 2024-10-09 RX ORDER — LIDOCAINE 40 MG/G
CREAM TOPICAL
Status: DISCONTINUED | OUTPATIENT
Start: 2024-10-09 | End: 2024-10-11 | Stop reason: HOSPADM

## 2024-10-09 RX ORDER — MISOPROSTOL 200 UG/1
800 TABLET ORAL
Status: DISCONTINUED | OUTPATIENT
Start: 2024-10-09 | End: 2024-10-11 | Stop reason: HOSPADM

## 2024-10-09 RX ORDER — CARBOPROST TROMETHAMINE 250 UG/ML
250 INJECTION, SOLUTION INTRAMUSCULAR
Status: DISCONTINUED | OUTPATIENT
Start: 2024-10-09 | End: 2024-10-09

## 2024-10-09 RX ORDER — AMOXICILLIN 250 MG
2 CAPSULE ORAL 2 TIMES DAILY
Status: DISCONTINUED | OUTPATIENT
Start: 2024-10-09 | End: 2024-10-11 | Stop reason: HOSPADM

## 2024-10-09 RX ORDER — ONDANSETRON 2 MG/ML
4 INJECTION INTRAMUSCULAR; INTRAVENOUS EVERY 30 MIN PRN
Status: DISCONTINUED | OUTPATIENT
Start: 2024-10-09 | End: 2024-10-09 | Stop reason: HOSPADM

## 2024-10-09 RX ORDER — METOCLOPRAMIDE HYDROCHLORIDE 5 MG/ML
10 INJECTION INTRAMUSCULAR; INTRAVENOUS EVERY 6 HOURS PRN
Status: DISCONTINUED | OUTPATIENT
Start: 2024-10-09 | End: 2024-10-11 | Stop reason: HOSPADM

## 2024-10-09 RX ORDER — OXYTOCIN 10 [USP'U]/ML
10 INJECTION, SOLUTION INTRAMUSCULAR; INTRAVENOUS
Status: DISCONTINUED | OUTPATIENT
Start: 2024-10-09 | End: 2024-10-09

## 2024-10-09 RX ORDER — OXYTOCIN/0.9 % SODIUM CHLORIDE 30/500 ML
340 PLASTIC BAG, INJECTION (ML) INTRAVENOUS CONTINUOUS PRN
Status: DISCONTINUED | OUTPATIENT
Start: 2024-10-09 | End: 2024-10-11 | Stop reason: HOSPADM

## 2024-10-09 RX ORDER — MODIFIED LANOLIN
OINTMENT (GRAM) TOPICAL
Status: DISCONTINUED | OUTPATIENT
Start: 2024-10-09 | End: 2024-10-11 | Stop reason: HOSPADM

## 2024-10-09 RX ORDER — TRANEXAMIC ACID 10 MG/ML
1 INJECTION, SOLUTION INTRAVENOUS EVERY 30 MIN PRN
Status: DISCONTINUED | OUTPATIENT
Start: 2024-10-09 | End: 2024-10-11 | Stop reason: HOSPADM

## 2024-10-09 RX ORDER — SODIUM PHOSPHATE,MONO-DIBASIC 19G-7G/118
1 ENEMA (ML) RECTAL DAILY PRN
Status: DISCONTINUED | OUTPATIENT
Start: 2024-10-11 | End: 2024-10-11 | Stop reason: HOSPADM

## 2024-10-09 RX ORDER — CITRIC ACID/SODIUM CITRATE 334-500MG
30 SOLUTION, ORAL ORAL
Status: COMPLETED | OUTPATIENT
Start: 2024-10-09 | End: 2024-10-09

## 2024-10-09 RX ORDER — BUPIVACAINE HYDROCHLORIDE 7.5 MG/ML
INJECTION, SOLUTION INTRASPINAL
Status: COMPLETED | OUTPATIENT
Start: 2024-10-09 | End: 2024-10-09

## 2024-10-09 RX ORDER — LOPERAMIDE HYDROCHLORIDE 2 MG/1
4 CAPSULE ORAL
Status: DISCONTINUED | OUTPATIENT
Start: 2024-10-09 | End: 2024-10-11 | Stop reason: HOSPADM

## 2024-10-09 RX ADMIN — Medication 2 G: at 10:50

## 2024-10-09 RX ADMIN — ACETAMINOPHEN 975 MG: 325 TABLET, FILM COATED ORAL at 10:32

## 2024-10-09 RX ADMIN — SODIUM CHLORIDE, POTASSIUM CHLORIDE, SODIUM LACTATE AND CALCIUM CHLORIDE 250 ML: 600; 310; 30; 20 INJECTION, SOLUTION INTRAVENOUS at 10:13

## 2024-10-09 RX ADMIN — SODIUM CITRATE AND CITRIC ACID MONOHYDRATE 30 ML: 500; 334 SOLUTION ORAL at 10:32

## 2024-10-09 RX ADMIN — ONDANSETRON 4 MG: 2 INJECTION INTRAMUSCULAR; INTRAVENOUS at 10:48

## 2024-10-09 RX ADMIN — PHENYLEPHRINE HYDROCHLORIDE 50 MCG/MIN: 10 INJECTION INTRAVENOUS at 10:45

## 2024-10-09 RX ADMIN — MORPHINE SULFATE 0.15 MG: 1 INJECTION EPIDURAL; INTRATHECAL; INTRAVENOUS at 10:35

## 2024-10-09 RX ADMIN — PHENYLEPHRINE HYDROCHLORIDE 100 MCG: 10 INJECTION INTRAVENOUS at 11:57

## 2024-10-09 RX ADMIN — BUPIVACAINE HYDROCHLORIDE IN DEXTROSE 1.6 ML: 7.5 INJECTION, SOLUTION SUBARACHNOID at 10:35

## 2024-10-09 RX ADMIN — Medication 300 ML/HR: at 11:23

## 2024-10-09 RX ADMIN — SODIUM CHLORIDE, POTASSIUM CHLORIDE, SODIUM LACTATE AND CALCIUM CHLORIDE: 600; 310; 30; 20 INJECTION, SOLUTION INTRAVENOUS at 10:37

## 2024-10-09 RX ADMIN — KETOROLAC TROMETHAMINE 30 MG: 30 INJECTION, SOLUTION INTRAMUSCULAR at 14:41

## 2024-10-09 RX ADMIN — ACETAMINOPHEN 975 MG: 325 TABLET, FILM COATED ORAL at 16:23

## 2024-10-09 RX ADMIN — ACETAMINOPHEN 975 MG: 325 TABLET, FILM COATED ORAL at 22:39

## 2024-10-09 RX ADMIN — FENTANYL CITRATE 15 MCG: 50 INJECTION INTRAMUSCULAR; INTRAVENOUS at 10:35

## 2024-10-09 RX ADMIN — SENNOSIDES AND DOCUSATE SODIUM 1 TABLET: 50; 8.6 TABLET ORAL at 22:39

## 2024-10-09 RX ADMIN — BUPIVACAINE HYDROCHLORIDE 20 ML: 2.5 INJECTION, SOLUTION EPIDURAL; INFILTRATION; INTRACAUDAL at 12:55

## 2024-10-09 RX ADMIN — KETOROLAC TROMETHAMINE 30 MG: 30 INJECTION, SOLUTION INTRAMUSCULAR at 20:45

## 2024-10-09 RX ADMIN — METHYLERGONOVINE MALEATE 200 MCG: 0.2 INJECTION INTRAVENOUS at 11:33

## 2024-10-09 RX ADMIN — TRANEXAMIC ACID 1 G: 10 INJECTION, SOLUTION INTRAVENOUS at 12:00

## 2024-10-09 RX ADMIN — BUPIVACAINE 20 ML: 13.3 INJECTION, SUSPENSION, LIPOSOMAL INFILTRATION at 12:59

## 2024-10-09 RX ADMIN — PHENYLEPHRINE HYDROCHLORIDE 100 MCG: 10 INJECTION INTRAVENOUS at 12:01

## 2024-10-09 RX ADMIN — CARBOPROST TROMETHAMINE 250 MCG: 250 INJECTION, SOLUTION INTRAMUSCULAR at 11:49

## 2024-10-09 ASSESSMENT — ACTIVITIES OF DAILY LIVING (ADL)
ADLS_ACUITY_SCORE: 19
ADLS_ACUITY_SCORE: 18
ADLS_ACUITY_SCORE: 19
ADLS_ACUITY_SCORE: 18
ADLS_ACUITY_SCORE: 19
ADLS_ACUITY_SCORE: 19
ADLS_ACUITY_SCORE: 18
ADLS_ACUITY_SCORE: 19
ADLS_ACUITY_SCORE: 18
ADLS_ACUITY_SCORE: 19
ADLS_ACUITY_SCORE: 35
ADLS_ACUITY_SCORE: 18
ADLS_ACUITY_SCORE: 18

## 2024-10-09 NOTE — PLAN OF CARE
Goal Outcome Evaluation: Patient arrived to [facility] unit via zoom cart at 1510 ,with belongings, accompanied by spouse/ significant other, with infant in arms. Received report from  Barbara ARMAS RN  and checked bands. Unit and room orientation started. Call light given; no concerns present at this time. Continue with plan of care.

## 2024-10-09 NOTE — ANESTHESIA CARE TRANSFER NOTE
Patient: Jennifer Felipe    Procedure: Procedure(s):  REPEAT  SECTION       Diagnosis: Supervision of other normal pregnancy, antepartum [Z34.80]  History of  [Z98.891]  Diagnosis Additional Information: No value filed.    Anesthesia Type:   Spinal     Note:    Oropharynx: oropharynx clear of all foreign objects and spontaneously breathing  Level of Consciousness: awake  Oxygen Supplementation: room air    Independent Airway: airway patency satisfactory and stable  Dentition: dentition unchanged  Vital Signs Stable: post-procedure vital signs reviewed and stable  Report to RN Given: handoff report given  Patient transferred to: PACU    Handoff Report: Identifed the Patient, Identified the Reponsible Provider, Reviewed the pertinent medical history, Discussed the surgical course, Reviewed Intra-OP anesthesia mangement and issues during anesthesia, Set expectations for post-procedure period and Allowed opportunity for questions and acknowledgement of understanding      Vitals:  Vitals Value Taken Time   BP 98/63 10/09/24 1310   Temp     Pulse 102 10/09/24 1313   Resp 20 10/09/24 1313   SpO2 98 % 10/09/24 1313   Vitals shown include unfiled device data.    Electronically Signed By: Erika Williamson DO  2024  1:14 PM

## 2024-10-09 NOTE — ANESTHESIA POSTPROCEDURE EVALUATION
Patient: Jennifer Felipe    Procedure: Procedure(s):  REPEAT  SECTION       Anesthesia Type:  Spinal    Note:  Disposition: Inpatient   Postop Pain Control: Uneventful            Sign Out: Well controlled pain   PONV:    Neuro/Psych: Uneventful            Sign Out: Acceptable/Baseline neuro status   Airway/Respiratory: Uneventful            Sign Out: Acceptable/Baseline resp. status   CV/Hemodynamics: Uneventful            Sign Out: Acceptable CV status; No obvious hypovolemia; No obvious fluid overload   Other NRE: NONE   DID A NON-ROUTINE EVENT OCCUR?          Patient Vitals for the past 24 hrs:   BP Temp Temp src Pulse Resp SpO2   10/09/24 1446 (!) 126/93 36.7  C (98  F) Oral -- 16 97 %   10/09/24 1439 110/72 -- -- -- 16 100 %   10/09/24 1438 -- -- -- -- -- 99 %   10/09/24 1430 -- -- -- -- -- 99 %   10/09/24 1425 -- -- -- 70 19 99 %   10/09/24 1415 107/78 -- -- 73 19 98 %   10/09/24 1400 98/82 -- -- 73 17 99 %   10/09/24 1345 101/68 -- -- 90 21 99 %   10/09/24 1330 90/71 -- -- 91 23 99 %   10/09/24 1315 109/65 -- -- 91 18 98 %   10/09/24 1310 98/63 -- -- 80 14 99 %   10/09/24 1305 (!) 139/113 36.7  C (98.1  F) Oral 91 -- 98 %   10/09/24 0900 108/64 36.9  C (98.5  F) Oral -- 16 --         Last vitals:  Vitals Value Taken Time   /93 10/09/24 1446   Temp 36.7  C (98  F) 10/09/24 1446   Pulse 70 10/09/24 1425   Resp 16 10/09/24 1446   SpO2 99 % 10/09/24 1447   Vitals shown include unfiled device data.    Electronically Signed By: Laurel Valencia MD  2024  2:48 PM

## 2024-10-09 NOTE — H&P
[facility]  Obstetrics History and Physical    Jennifer Felipe  : 1990  MRN: [id number]    HPI:  Ms. Jennifer Felipe is a 34 year old  at 38w3d by LMP c/w 11w US, who presents for scheduled repeat . Delivery indicated prior to 39 wks due to ICP complicating pregnancy.  She endorses  intermittent contractions, vaginal bleeding, and loss of fluid.   + normal fetal movement.    Pregnancy Complications:  - ICP managed on ursodiol with max bile acid level > 300 at 26 wks   - Severe penicillin allergy  - h/o C/S in [place]      Prenatal Labs:   Lab Results   Component Value Date    AS Negative 10/07/2024    HEPBANG Nonreactive 2024    CHPCRT Negative 2024    GCPCRT Negative 2024    HGB 11.3 (L) 10/07/2024       GBS Status: negative        Ultrasounds:  GREGG MICHAEL Single  Narrative:         [facility]  ---------------------------------------------------------------------------------------------------------  Pat. Name: JENNIFER FELIPE       Study Date:  10/08/2024 3:29pm  Pat. NO:  [id number]        Referring  MD: SARA JASSO  Site:         Sonographer: Luz Marina Carpio RDMS  :  1990        Age:   34  ---------------------------------------------------------------------------------------------------------    INDICATION  ---------------------------------------------------------------------------------------------------------  Cholestasis in pregnancy.    METHOD  ---------------------------------------------------------------------------------------------------------  Transabdominal ultrasound examination. View: Sufficient    PREGNANCY  ---------------------------------------------------------------------------------------------------------  Agu[place]re pregnancy. Number of fetuses: 1    DATING  ---------------------------------------------------------------------------------------------------------                                           Date                                 Details                                                                                      Gest. age                      DONYA  LMP                                  1/14/2024                        Cycle: regular cycle                                                                    38 w + 2 d                     10/20/2024  Previous U/S                      4/2/2024                          GA, GA 11 w + 4 d                                                                      38 w + 4 d                     10/18/2024  Assigned dating                  based on the LMP, selected on 06/19/2024                                                                         38 w + 2 d                     10/20/2024    GENERAL EVALUATION  ---------------------------------------------------------------------------------------------------------  Cardiac activity present.  bpm. Fetal movements: visualized. Presentation: cephalic  Placenta: anterior, no previa > 2 cm from internal os  Umbilical cord: previously studied    AMNIOTIC FLUID ASSESSMENT  ---------------------------------------------------------------------------------------------------------  Amount of AF: normal  MVP 6.1 cm    BIOPHYSICAL PROFILE  ---------------------------------------------------------------------------------------------------------  2: Fetal breathing movements  2: Gross body movements  2: Fetal tone  2: Amniotic fluid volume  8/8 Biophysical profile score  Interpretation: normal    RECOMMENDATION  ---------------------------------------------------------------------------------------------------------  We discussed the findings on today's ultrasound with the patient.    Return to primary provider for continued prenatal care.    Thank-you for the opportunity to participate in the care of this patient. If you have questions regarding today's evaluation or if we can be of further service, please contact the  Maternal-Fetal Medicine  Center.    **Fetal anomalies may be present but not detected**  Impression: IMPRESSION  ---------------------------------------------------------------------------------------------------------  1) Normal amniotic fluid volume.  2) BPP is reassuring.       OB History  OB History    Para Term  AB Living   2 1 1 0 0 1   SAB IAB Ectopic Multiple Live Births   0 0 0 0 1      # Outcome Date GA Lbr Sai/2nd Weight Sex Type Anes PTL Lv   2 Current            1 Term 10/02/20 39w0d  2.722 kg (6 lb) F -SEC   KAREN       PMHx:   Past Medical History:   Diagnosis Date    Cholestasis during pregnancy      PSHx:   Past Surgical History:   Procedure Laterality Date     SECTION      in [facility].  Due to fetal distress during induction. pt reports cervix not dilating     Meds:   Medications Prior to Admission   Medication Sig Dispense Refill Last Dose    cyanocobalamin (VITAMIN B-12) 1000 MCG tablet Take 1 tablet (1,000 mcg) by mouth daily 100 tablet 3 10/8/2024    Prenatal Vit-Fe Fumarate-FA (PRENATAL MULTIVITAMIN W/IRON) 27-0.8 MG tablet Take 1 tablet by mouth daily 90 tablet 3 10/8/2024    ursodiol (ACTIGALL) 300 MG capsule Take 1 capsule (300 mg) by mouth 3 times daily for 200 days 270 capsule 2 10/8/2024    vitamin C (ASCORBIC ACID) 250 MG tablet Take 1 tablet (250 mg) by mouth daily 90 tablet 1 10/8/2024    vitamin D3 (CHOLECALCIFEROL) 50 mcg (2000 units) tablet Take 1 tablet (50 mcg) by mouth daily 100 tablet 3 10/8/2024    acetaminophen (TYLENOL) 325 MG tablet Take 2 tablets (650 mg) by mouth every 6 hours as needed for mild pain. Start after Delivery. 100 tablet 0     cyanocobalamin (VITAMIN B-12) 500 MCG tablet Take 2 tablets (1,000 mcg) by mouth daily 180 tablet 1     hydrOXYzine HCl (ATARAX) 25 MG tablet Take 1 tablet (25 mg) by mouth 3 times daily as needed for itching (Patient not taking: Reported on 2024) 40 tablet 1     ibuprofen (ADVIL/MOTRIN) 600 MG tablet Take 1 tablet (600 mg) by mouth  every 6 hours as needed for moderate pain. Start after delivery 60 tablet 0     Iron, Ferrous Sulfate, 325 (65 Fe) MG TABS Take 1 tablet by mouth every other day. 45 tablet 1     Prenatal Vit-DSS-Fe Cbn-FA (PRENATAL AD PO) Take by mouth.       senna-docusate (SENOKOT-S/PERICOLACE) 8.6-50 MG tablet Take 1 tablet by mouth daily. Start after delivery. 100 tablet 0      Allergies:     Allergies   Allergen Reactions    Penicillin G Angioedema, Nausea and Vomiting, Hives and Itching     24 Dr. Willie Gooden: Drug allergy testing completed. +/++ to penicillin G and + to ampicillin. No reaction to cefazolin. Avoid ALL penicillin antibiotics (including Benzylpenicilloyl-poly-Lysin), but cephalosporins can be used. Penem antibiotics have not been tested.     FmHx:   Family History   Problem Relation Age of Onset    Hypertension Mother     Thyroid Disease Mother     No Known Problems Father     No Known Problems Brother     No Known Problems Brother     No Known Problems Brother     No Known Problems Daughter      SocHx: She denies any tobacco, alcohol, or other drug use during this pregnancy.    ROS:   Complete 10-point ROS negative except as noted in HPI.She denies headache, blurry vision, chest pain, shortness of breath, RUQ pain, nausea, vomiting, dysuria, hematuria or extremity edema.    PE:  Vit: Patient Vitals for the past 4 hrs:   BP Temp Temp src Resp   10/09/24 0900 108/64 98.5  F (36.9  C) Oral 16      Gen: Well-appearing, NAD, comfortable   CV: RR, well perfused  Pulm: NWOB  Abd: Soft, gravid, non-tender, EFW 7# by [place]  Cx: Deferred    Pres:  Ceph by US 10/8  Memb: intact    FHT: Baseline 140, moderate variability, + accelerations, no decelerations   [place]: 2-3 contractions in 10 minutes, irritability         Assessment  Ms. Jennifer Felipe is a 34 year old , at 38w3d by LMP, who presents for scheduled repeat  section.    Plan  Admit to L&D for scheduled repeat  section. The risks,  benefits, and alternatives of  section were discussed, including the risks of bleeding, infection, injury to surrounding organs, fetal injury, and remote risks of hysterectomy. She consented to a blood transfusion in the event of a life threatening amount of bleeding. She had time to ask questions and agreed to proceed. Surgical consent was signed. Unable to review previous op note as c/s was performed in [place] for what sounds like non-reassuring fetal heart monitoring.    Pain:  Spinal per anesthesia.   ID:  Ancef for rudy-op antibiotics. Patient with severe penicillin allergy but cephalosporins have been tolerated in past.  FWB: Category 1 FHT.  Continue EFM.   PNC: Rh pos, Rubella immune, GBS neg, GCT nl, Placenta anterior  Fen/GI: NPO, IVFs.  PPH ppx: CBC, T&S.     The patient was discussed with Dr. Ozuna who is in agreement with the treatment plan.    # ICP  - BA most recently 23 in September  - LFTs have been o/w wnl  - Minimally symptomatic on ursadiol    Diana Larson MD  Obstetrics & Gynecology, PGY-2  10/09/2024 10:35 AM    Physician Attestation   I, Audelia Ozuna MD, personally examined and evaluated this patient.  I discussed the patient with the resident and care team, and agree with the assessment and plan of care as documented in the note above.   I personally reviewed vital signs, medications, labs, fetal heart tones and toco. Fetal status is reassuring with a reactive NST.   Key findings: Patient is here for scheduled repeat  section at 38w3d due to ICP in pregnancy controlled on ursodiol with up trending bile acids.  Fetal status is reassuring with a reactive NST.   We have discussed Bilateral salpingectomy  and they have decided they are not ready for something that permanent although they don't expect to have more children. We reviewed the procedure and informed consent was obtained.   Audelia Ozuna MD   2024

## 2024-10-09 NOTE — OP NOTE
[facility]  Full Operative Progress Note     Surgery Date:  10/9/2024    Surgeon:  Audelia Ozuna MD    Assistants:  Diana Larson, PGY-2    Jimmy Waldron, MS3    Pre-op Diagnosis:    - Intrauterine pregnancy at 38w3d  - ICP  - h/o c/s x1    Post-op Diagnosis:    - Same   - Vigorous male infant     Procedure: Repeat high-transverse  section with single layer uterine closure via pfannenstiel incision    Anesthesia: Spinal  EBL: 1400 ml   IVF: 2000 mL crystalloid  UOP: 150 mL clear urine at the end of the case  Drains: [place] Catheter   Specimens: Routine cord blood, cord segment  Additional Medications: 2g Ancef  Complications: right uterine artery laceration. Repaired with right [place]    Indications:   Jennifer Felipe is a 34 year old  at 38w3d admitted for planned repeat . Delivery indicated < 39 wks due to ICP.  The risks, benefits, and alternatives of  section were discussed with the patient including bleeding, infection, injury to surrounding structures (nerves, blood vessels, uterus, cervix, tubes, ovaries, bladder, bowel, rarely baby), and she agreed to proceed. Written consent obtained for proceeding with  section and for blood transfusion in the setting of hemorrhage or acute blood loss anemia.    Findings:   Moderate adhesions between bladder and rectus, omentum adherent to anterior peritoneum,  minimal intraabdominal adhesions.  Thickening in the left aspect of the lower uterine segment so incision was made above that area.   Clear amniotic fluid  Vigorous male infant in OT presentation. Apgars 8 at 1 minute & 9 at 5 minutes. Weight 3260.  Normal uterus, fallopian tubes, and ovaries.     Procedure Details:   The patient was brought to the OR, where adequate spinal anesthesia was administered.  She was placed in the dorsal supine position with a slight leftward tilt. She was prepped and draped in the usual sterile fashion. A surgical  time out was performed. Her previous pfannenstiel scar was excised sharply, and carried down to the underlying fascia with sharp and blunt dissection. The fascia was incised in the midline, and the incision was extended laterally with the [place] scissors. The superior aspect of the fascia was grasped with the Kocher clamps and dissected off of the underlying rectus muscles with blunt and sharp dissection.  Attention was then turned to the inferior aspect of the fascia, which was similarly dissected off of the underlying rectus muscles. The rectus muscles were  in the midline, and the peritoneum was entered bluntly, and the opening was extended with digital pressure and electrosurgery. A band of omentum was adherent to the peritoneum and had to be dissected off.  The bladder blade was placed. A transverse hysterotomy was made with the scalpel in the lower aspect of the muscular portion of the uterus, and the incision was extended with digital pressure. The infant was noted to be in the OT position, and was delivered atraumatically. The shoulders delivered easily.  A nuchal cord was noted. The cord was doubly clamped and cut after 60 seconds, and the infant was handed off to the awaiting nursery staff. A segment of cord was cut and collected. The placenta was delivered with gentle traction on the umbilical cord and uterine massage. The uterus was exteriorized and cleared of all clots and debris. Uterine tone was noted to be inadequate with pitocin given through the running IV and uterine massage so methergine and hemabate were administered. Arterial bleeding was noted on the right aspect of the hysterotomy from the right uterine artery,  so an [place] stitch of 0-Monocryl was placed. The hysterotomy was closed with a running locked suture of 0 Monocryl. An additional figure of x suture of 0 Monocryl was placed to control bleeding near the right aspect of the hysterotomy. The hysterotomy was noted to be  hemostatic.     The posterior cul-de-sac was cleared of all clots and debris. The uterus was returned to the abdomen. The pericolic gutters were cleared of all clots and debris. The hysterotomy was reexamined and noted to be hemostatic. The fascia and rectus muscles were examined and areas of oozing were controlled with electrocautery. The peritoneum was closed with a running suture of 3-0 vicryl. The fascia was closed with a running 0 Vicryl suture. The subcutaneous tissue was irrigated and areas of oozing were controlled with electrocautery. Subcutaneous tissue was closed with 2-0 plain gut and a running subdermal suture of 2-0 plain gut was used to approximate skin edges and reduce tension. The skin was closed with 4-0 Monocryl and covered with steri strips and overlying sterile dressing.    All sponge, needle, and instrument counts were correct. The patient tolerated the procedure well, and was transferred to recovery in stable condition.     Dr. Ozuna was present and scrubbed for the procedure.     Diana Larson, PGY-2    Physician Attestation     I was present for the entire procedure from the beginning to the end. I have reviewed and edited the above operative report which accurately reflects the exact findings and details of the surgical procedure.    Audelia Ozuna MD   October 9, 2024

## 2024-10-09 NOTE — ANESTHESIA PROCEDURE NOTES
"Intrathecal injection Procedure Note    Pre-Procedure   Staff -        Anesthesiologist:  Laurel Valencia MD       Resident/Fellow: Erika Williamson DO       Performed By: resident       Location: OB       Procedure Start/Stop Times: 10/9/2024 10:35 AM       Pre-Anesthestic Checklist: patient identified, IV checked, risks and benefits discussed, informed consent, monitors and equipment checked, pre-op evaluation, at physician/surgeon's request and post-op pain management  Timeout:       Correct Patient: Yes        Correct Procedure: Yes        Correct Site: Yes        Correct Position: Yes   Procedure Documentation  Procedure: intrathecal injection       Patient Position: sitting       Skin prep: Chloraprep       Insertion Site: L3-4. (midline approach).       Needle Gauge: 25.        Needle Length (Inches): 3.5        Spinal Needle Type: Pencan       Introducer used       Introducer: 20 G       # of attempts: 1 and  # of redirects:  0    Assessment/Narrative         Paresthesias: No.       Sensory Level: T6       CSF fluid: clear.    Medication(s) Administered   0.75% Hyperbaric Bupivacaine (Intrathecal) - Intrathecal   1.6 mL - 10/9/2024 10:35:00 AM  Fentanyl PF (Intrathecal) - Intrathecal   15 mcg - 10/9/2024 10:35:00 AM  Morphine PF 1 mg/mL (Intrathecal) - Intrathecal   0.15 mg - 10/9/2024 10:35:00 AM  Medication Administration Time: 10/9/2024 10:35 AM      FOR [facility] ([facility]) ONLY:   Pain Team Contact information: please page the Pain Team Via [employer]. Search \"Pain\". During daytime hours, please page the attending first. At night please page the resident first.      "

## 2024-10-09 NOTE — ANESTHESIA PROCEDURE NOTES
"TAP Procedure Note    Pre-Procedure   Staff -        Anesthesiologist:  Laurel Valencia MD       Resident/Fellow: Erika Williamson DO       Performed By: resident and anesthesiologist       Location: OR       Pre-Anesthestic Checklist: patient identified, IV checked, site marked, risks and benefits discussed, informed consent, monitors and equipment checked, pre-op evaluation, at physician/surgeon's request and post-op pain management  Timeout:       Correct Patient: Yes        Correct Procedure: Yes        Correct Site: Yes        Correct Position: Yes        Correct Laterality: Yes        Site Marked: Yes  Procedure Documentation  Procedure: TAP       Diagnosis: POST-OP PAIN MANAGEMENT       Laterality: bilateral       Patient Position: supine       Skin prep: Chloraprep       Insertion Site: T10-11.       Needle Type: insulated       Needle Gauge: 21.        Needle Length (millimeters): 110        Ultrasound guided       1. Ultrasound was used to identify targeted nerve, plexus, vascular marker, or fascial plane and place a needle adjacent to it in real-time.       2. Ultrasound was used to visualize the spread of anesthetic in close proximity to the above referenced structure.       3. A permanent image is entered into the patient's record.    Assessment/Narrative         The placement was negative for: blood aspirated, painful injection and site bleeding       Paresthesias: No.       Bolus given via needle. no blood aspirated via catheter.        Secured via.        Insertion/Infusion Method: Single Shot       Complications: none    Medication(s) Administered   Bupivacaine 0.25% PF (Infiltration) - Infiltration   20 mL - 10/9/2024 12:55:00 PM  Bupivacaine liposome (Exparel) 1.3% LA inj susp (Infiltration) - Infiltration   20 mL - 10/9/2024 12:59:00 PM    FOR [employer] ([facility]) ONLY:   Pain Team Contact information: please page the Pain Team Via [employer]. Search \"Pain\". During daytime hours, please page the " attending first. At night please page the resident first.

## 2024-10-10 LAB — HGB BLD-MCNC: 8.6 G/DL (ref 11.7–15.7)

## 2024-10-10 PROCEDURE — 250N000013 HC RX MED GY IP 250 OP 250 PS 637

## 2024-10-10 PROCEDURE — 250N000011 HC RX IP 250 OP 636

## 2024-10-10 PROCEDURE — 36415 COLL VENOUS BLD VENIPUNCTURE: CPT

## 2024-10-10 PROCEDURE — 120N000003 HC R&B IMCU UMMC

## 2024-10-10 PROCEDURE — 85018 HEMOGLOBIN: CPT

## 2024-10-10 RX ADMIN — SIMETHICONE 80 MG: 80 TABLET, CHEWABLE ORAL at 16:14

## 2024-10-10 RX ADMIN — SENNOSIDES AND DOCUSATE SODIUM 1 TABLET: 50; 8.6 TABLET ORAL at 09:00

## 2024-10-10 RX ADMIN — ACETAMINOPHEN 975 MG: 325 TABLET, FILM COATED ORAL at 17:11

## 2024-10-10 RX ADMIN — KETOROLAC TROMETHAMINE 30 MG: 30 INJECTION, SOLUTION INTRAMUSCULAR at 02:42

## 2024-10-10 RX ADMIN — ACETAMINOPHEN 975 MG: 325 TABLET, FILM COATED ORAL at 04:53

## 2024-10-10 RX ADMIN — OXYCODONE HYDROCHLORIDE 5 MG: 5 TABLET ORAL at 04:53

## 2024-10-10 RX ADMIN — IBUPROFEN 800 MG: 800 TABLET, FILM COATED ORAL at 09:00

## 2024-10-10 RX ADMIN — SIMETHICONE 80 MG: 80 TABLET, CHEWABLE ORAL at 09:01

## 2024-10-10 RX ADMIN — ACETAMINOPHEN 975 MG: 325 TABLET, FILM COATED ORAL at 11:17

## 2024-10-10 RX ADMIN — SIMETHICONE 80 MG: 80 TABLET, CHEWABLE ORAL at 22:36

## 2024-10-10 RX ADMIN — IBUPROFEN 800 MG: 800 TABLET, FILM COATED ORAL at 16:14

## 2024-10-10 RX ADMIN — SENNOSIDES AND DOCUSATE SODIUM 2 TABLET: 50; 8.6 TABLET ORAL at 22:36

## 2024-10-10 RX ADMIN — IBUPROFEN 800 MG: 800 TABLET, FILM COATED ORAL at 22:37

## 2024-10-10 RX ADMIN — ACETAMINOPHEN 975 MG: 325 TABLET, FILM COATED ORAL at 22:48

## 2024-10-10 ASSESSMENT — ACTIVITIES OF DAILY LIVING (ADL)
ADLS_ACUITY_SCORE: 19
ADLS_ACUITY_SCORE: 19
ADLS_ACUITY_SCORE: 18
ADLS_ACUITY_SCORE: 18
ADLS_ACUITY_SCORE: 19
ADLS_ACUITY_SCORE: 18
ADLS_ACUITY_SCORE: 19
ADLS_ACUITY_SCORE: 19
ADLS_ACUITY_SCORE: 18
ADLS_ACUITY_SCORE: 19
ADLS_ACUITY_SCORE: 18
ADLS_ACUITY_SCORE: 18
ADLS_ACUITY_SCORE: 19
ADLS_ACUITY_SCORE: 18
ADLS_ACUITY_SCORE: 19
ADLS_ACUITY_SCORE: 19

## 2024-10-10 NOTE — PROGRESS NOTES
[facility]   Post-partum Progress Note    Name:  Jennifer Felipe  MRN: [id number]    S:   Patient reports feeling well this morning.  Pain is currently well controlled.  Tolerating regular diet without nausea or vomiting.  Ambulating without dizziness.  Voiding spontaneously. Has not passed flatus. Lochia similar to menstrual flow. Breast feeding.     O:   Patient Vitals for the past 24 hrs:   BP Temp Temp src Pulse Resp SpO2   10/10/24 0459 106/63 -- -- -- -- 98 %   10/10/24 0301 -- -- -- -- -- 98 %   10/10/24 0005 103/58 98.2  F (36.8  C) Oral -- 18 99 %   10/09/24 2000 117/70 98.5  F (36.9  C) Oral -- -- 100 %   10/09/24 1900 115/78 97.8  F (36.6  C) Oral -- -- 100 %   10/09/24 1800 110/65 97.8  F (36.6  C) Oral -- -- 100 %   10/09/24 1700 100/67 -- -- -- -- --   10/09/24 1647 95/64 97.5  F (36.4  C) Oral -- -- 100 %   10/09/24 1600 95/64 -- -- -- -- 99 %   10/09/24 1547 102/71 -- -- -- -- --   10/09/24 1530 111/71 -- -- -- -- 100 %   10/09/24 1515 -- -- -- -- -- 100 %   10/09/24 1511 99/68 97.4  F (36.3  C) Oral -- 18 100 %   10/09/24 1446 (!) 126/93 98  F (36.7  C) Oral -- 16 97 %   10/09/24 1439 110/72 -- -- -- 16 100 %   10/09/24 1438 -- -- -- -- -- 99 %   10/09/24 1430 -- -- -- -- -- 99 %   10/09/24 1425 -- -- -- 70 19 99 %   10/09/24 1415 107/78 -- -- 73 19 98 %   10/09/24 1400 98/82 -- -- 73 17 99 %   10/09/24 1345 101/68 -- -- 90 21 99 %   10/09/24 1330 90/71 -- -- 91 23 99 %   10/09/24 1315 109/65 -- -- 91 18 98 %   10/09/24 1310 98/63 -- -- 80 14 99 %   10/09/24 1305 (!) 139/113 98.1  F (36.7  C) Oral 91 -- 98 %   10/09/24 0900 108/64 98.5  F (36.9  C) Oral -- 16 --     Gen:  Resting comfortably, NAD  CV:  Regular rate, well perfused.   Pulm:  Non-labored breathing. No cough or audible wheezing.   Abd:  Soft, appropriately tender to palpation, non-distended.  Fundus below umbilicus, firm, and non-tender.  Inc:  Clean island dressing in place   Ext:  Non-tender, trace+ LE edema  b/l    Hgb:   Recent Labs   Lab 10/10/24  0635 10/07/24  1635   HGB 8.6* 11.3*     A/P:  34 year old  POD#1 s/p RLTCS. Pregnancy notable for ICP. Continue with routine postpartum management.     # ICP  BA 23, LFTs normal antepartum. S/p Ursodiol.     # MRBP <4h  - If additional elevated BP will obtain HELLP labs     # Routine Postpartum  Pain: Well-controlled with scheduled tylenol, toradol > ibuprofen, PRN oxy  Hgb: 11.3> EBL 1340 (mthg, garima, R O'[place])> 8.6. Will discharge with PO Fe.   GI:  BID Senna/Colace.  PRN Simethicone.   : S/p salinas  voiding spontaneously  Rh: Positive  Rub:  Immune  Hep B: Surface antigen negative  Baby: In room, doing well  Feed: Breast feeding  BC: Will discuss prior to discharge   PPx:  Encourage ambulation, IS, SCDs while confined to bed    Medically Ready for Discharge: Anticipated Tomorrow    Emily Lipscomb MD  Ob/Gyn PGY-3  10/10/24 6:56 AM

## 2024-10-10 NOTE — PROGRESS NOTES
Post  Anesthesia Follow Up Note    Patient: Jennifer Felipe    Patient location: Postpartum floor.    Chief complaint: Acute postoperative pain management s/p intrathecal analgesic administration     Procedure(s) Performed:  Procedure(s):  REPEAT  SECTION    Anesthesia type: Spinal Block    Subjective:     Pain Control: Adequate    Additional ROS:  She does not complain of pruritis at this time. She denies weakness, denies paresthesia, denies difficulties breathing or voiding, denies nausea or vomiting. She is able to ambulate and tolerates regular diet.    Objective:    Respiratory Function (RR / SpO2 / Airway Patency): Satisfactory    Cardiac Function (HR / Rhythm / BP): Satisfactory    Strength and sensation lower extremities: Normal    Site of spinal/epidural insertion: No signs of infection or inflammation.     Last Vitals: /62 (BP Location: Left arm, Patient Position: Semi-Guevara's, Cuff Size: Adult Regular)   Pulse 84   Temp 37.1  C (98.7  F) (Oral)   Resp 18   LMP 2024   SpO2 99%   Breastfeeding Unknown     Assessment and plan:   Jennifer Felipe is a 34 year old female  POD #1 s/p   with IT bupivacaine, fentanyl and morphine; and single shot TAP nerve block injections.  Pt is ambulating without difficulty, no weakness or paresthesias.  There is no evidence of adverse side effects associated with spinal and nerve block injections.  The patient is receiving adequate incisional pain control at this time and anticipate up to 72 hours of incisional pain control.  However, we further anticipate that the patient will require opioid/nonopioid analgesics for visceral and muscle pain that is not controlled with local anesthetic.      In brief summary, her post-operative analgesia is adequate today. Further interventional analgesic strategies would be of little utility at this time. Thus, we recommend proceeding with PO analgesics.    Thank you for including us in the care  for this patient.    Whitney Gonzalez MD, MPH  Anesthesiology PGY3, CA2

## 2024-10-10 NOTE — PLAN OF CARE
Problem: Postpartum ( Delivery)  Goal: Nausea and Vomiting Relief  Outcome: Met     Problem: Postpartum ( Delivery)  Goal: Effective Urinary Elimination  Outcome: Met     Problem: Postpartum ( Delivery)  Goal: Effective Oxygenation and Ventilation  Outcome: Met     Problem: Postpartum ( Delivery)  Goal: Optimal Pain Control and Function  Intervention: Prevent or Manage Pain  Recent Flowsheet Documentation  Taken 10/10/2024 1711 by Mónica Fletcher, RN  Pain Management Interventions: medication (see MAR)  Taken 10/10/2024 1614 by Mónica Fletcher, RN  Pain Management Interventions: medication (see MAR)  Taken 10/10/2024 1200 by Mónica Fletcher, RN  Pain Management Interventions: medication offered but refused   Goal Outcome Evaluation:      Plan of Care Reviewed With: patient    Overall Patient Progress: improvingOverall Patient Progress: improving   Met outcomes for effective urination, voids completed. Sats maintained on room air. VSS. Pain managed with scheduled medications. Simethicone given for gas discomfort. Encouraged ambulation. Positive bonding observed with .  at bedside attentive to patient.

## 2024-10-10 NOTE — PLAN OF CARE
Vitals are stable. Lochia is scant with no clots. IV is saline locked. She voided 650 mL 1x since her salinas removal at 1910. She is breastfeeding well with minimal assistance. Pain has been controlled with toradol and ibuprofen. She is bonding well with baby and has been doing skin-to-skin. Continue with postpartum cares.

## 2024-10-10 NOTE — DISCHARGE SUMMARY
[facility]  Discharge Summary    Jennifer Felipe MRN# [id number]   Age: 34 year old YOB: 1990     Date of Admission:  10/9/2024  Date of Discharge::  10/11/24  Admitting Physician:  Audelia Ozuna MD  Discharge Physician:  Mirlande Harrison MD        Admission Diagnosis:   Intrauterine pregnancy at 38w3d  Intrahepatic cholestasis of pregnancy  H/o  x1        Discharge Diagnosis:   Same, delivered  Postpartum hemorrhage  Acute blood loss anemia       Procedures:   Repeat high-transverse  section with single layer uterine closure via pfannenstiel incision   Spinal anesthesia  TAP block       Medications Prior to Admission:     Medications Prior to Admission   Medication Sig Dispense Refill Last Dose    Prenatal Vit-Fe Fumarate-FA (PRENATAL MULTIVITAMIN W/IRON) 27-0.8 MG tablet Take 1 tablet by mouth daily 90 tablet 3 10/8/2024    vitamin C (ASCORBIC ACID) 250 MG tablet Take 1 tablet (250 mg) by mouth daily 90 tablet 1 10/8/2024    vitamin D3 (CHOLECALCIFEROL) 50 mcg (2000 units) tablet Take 1 tablet (50 mcg) by mouth daily 100 tablet 3 10/8/2024    acetaminophen (TYLENOL) 325 MG tablet Take 2 tablets (650 mg) by mouth every 6 hours as needed for mild pain. Start after Delivery. 100 tablet 0     cyanocobalamin (VITAMIN B-12) 500 MCG tablet Take 2 tablets (1,000 mcg) by mouth daily 180 tablet 1     ibuprofen (ADVIL/MOTRIN) 600 MG tablet Take 1 tablet (600 mg) by mouth every 6 hours as needed for moderate pain. Start after delivery 60 tablet 0     senna-docusate (SENOKOT-S/PERICOLACE) 8.6-50 MG tablet Take 1 tablet by mouth daily. Start after delivery. 100 tablet 0     [DISCONTINUED] cyanocobalamin (VITAMIN B-12) 1000 MCG tablet Take 1 tablet (1,000 mcg) by mouth daily 100 tablet 3 10/8/2024    [DISCONTINUED] hydrOXYzine HCl (ATARAX) 25 MG tablet Take 1 tablet (25 mg) by mouth 3 times daily as needed for itching (Patient not taking: Reported on  8/2/2024) 40 tablet 1     [DISCONTINUED] Iron, Ferrous Sulfate, 325 (65 Fe) MG TABS Take 1 tablet by mouth every other day. 45 tablet 1     [DISCONTINUED] Prenatal Vit-DSS-Fe Cbn-FA (PRENATAL AD PO) Take by mouth.       [DISCONTINUED] ursodiol (ACTIGALL) 300 MG capsule Take 1 capsule (300 mg) by mouth 3 times daily for 200 days 270 capsule 2 10/8/2024        Discharge Medications:        Review of your medicines        CONTINUE these medicines which may have CHANGED, or have new prescriptions. If we are uncertain of the size of tablets/capsules you have at home, strength may be listed as something that might have changed.        Dose / Directions   * acetaminophen 325 MG tablet  Commonly known as: TYLENOL  This may have changed: Another medication with the same name was added. Make sure you understand how and when to take each.  Used for: High-risk pregnancy, unspecified trimester      Dose: 650 mg  Take 2 tablets (650 mg) by mouth every 6 hours as needed for mild pain. Start after Delivery.  Quantity: 100 tablet  Refills: 0     * acetaminophen 325 MG tablet  Commonly known as: TYLENOL  This may have changed: You were already taking a medication with the same name, and this prescription was added. Make sure you understand how and when to take each.      Dose: 650 mg  Take 2 tablets (650 mg) by mouth every 6 hours as needed for mild pain. Start after Delivery.  Quantity: 100 tablet  Refills: 0     cyanocobalamin 500 MCG tablet  Commonly known as: Vitamin B-12  This may have changed: Another medication with the same name was removed. Continue taking this medication, and follow the directions you see here.  Used for: Anemia during pregnancy in first trimester      Dose: 1,000 mcg  Take 2 tablets (1,000 mcg) by mouth daily  Quantity: 180 tablet  Refills: 1     * ibuprofen 600 MG tablet  Commonly known as: ADVIL/MOTRIN  This may have changed: Another medication with the same name was added. Make sure you understand how  and when to take each.  Used for: High-risk pregnancy, unspecified trimester      Dose: 600 mg  Take 1 tablet (600 mg) by mouth every 6 hours as needed for moderate pain. Start after delivery  Quantity: 60 tablet  Refills: 0     * ibuprofen 600 MG tablet  Commonly known as: ADVIL/MOTRIN  This may have changed: You were already taking a medication with the same name, and this prescription was added. Make sure you understand how and when to take each.      Dose: 600 mg  Take 1 tablet (600 mg) by mouth every 6 hours as needed for moderate pain. Start after delivery  Quantity: 60 tablet  Refills: 0     * senna-docusate 8.6-50 MG tablet  Commonly known as: SENOKOT-S/PERICOLACE  This may have changed: Another medication with the same name was added. Make sure you understand how and when to take each.  Used for: High-risk pregnancy, unspecified trimester      Dose: 1 tablet  Take 1 tablet by mouth daily. Start after delivery.  Quantity: 100 tablet  Refills: 0     * senna-docusate 8.6-50 MG tablet  Commonly known as: SENOKOT-S/PERICOLACE  This may have changed: You were already taking a medication with the same name, and this prescription was added. Make sure you understand how and when to take each.      Dose: 1 tablet  Take 1 tablet by mouth daily. Start after delivery.  Quantity: 100 tablet  Refills: 0           * This list has 6 medication(s) that are the same as other medications prescribed for you. Read the directions carefully, and ask your doctor or other care provider to review them with you.                CONTINUE these medicines which have NOT CHANGED        Dose / Directions   Iron (Ferrous Sulfate) 325 (65 Fe) MG Tabs  Used for: High-risk pregnancy, unspecified trimester, Anemia during pregnancy in first trimester      Dose: 1 tablet  Take 1 tablet by mouth every other day.  Quantity: 50 tablet  Refills: 1     prenatal multivitamin w/iron 27-0.8 MG tablet  Used for: Encounter for supervision of normal first  pregnancy in first trimester      Dose: 1 tablet  Take 1 tablet by mouth daily  Quantity: 90 tablet  Refills: 3     vitamin C 250 MG tablet  Commonly known as: ASCORBIC ACID  Used for: Anemia during pregnancy in first trimester      Dose: 250 mg  Take 1 tablet (250 mg) by mouth daily  Quantity: 90 tablet  Refills: 1     vitamin D3 50 mcg (2000 units) tablet  Commonly known as: CHOLECALCIFEROL  Used for: High-risk pregnancy, unspecified trimester      Dose: 1 tablet  Take 1 tablet (50 mcg) by mouth daily  Quantity: 100 tablet  Refills: 3            STOP taking      hydrOXYzine HCl 25 MG tablet  Commonly known as: ATARAX        PRENATAL AD PO        ursodiol 300 MG capsule  Commonly known as: ACTIGALL                  Where to get your medicines        These medications were sent to [facility] [place], [place] - [address]  [address] [address]      Phone: [phone]   acetaminophen 325 MG tablet  ibuprofen 600 MG tablet  Iron (Ferrous Sulfate) 325 (65 Fe) MG Tabs  senna-docusate 8.6-50 MG tablet           Consultations:   Anesthesia  Lactation        Brief Admission History:   Jennifer Felipe is a 34 year old  at 38w3d admitted for planned repeat . Delivery indicated < 39 wks due to ICP.  The risks, benefits, and alternatives of  section were discussed with the patient including bleeding, infection, injury to surrounding structures (nerves, blood vessels, uterus, cervix, tubes, ovaries, bladder, bowel, rarely baby), and she agreed to proceed. Written consent obtained for proceeding with  section and for blood transfusion in the setting of hemorrhage or acute blood loss anemia.     See admission H&P for full details.       Intraoperative Course:   The procedure was complicated by a postpartum hemorrhage secondary to right uterine artery laceration.  QBL 1340 mL.      Findings:  Moderate adhesions between bladder and rectus, omentum adherent to  anterior peritoneum,  minimal intraabdominal adhesions.  Thickening in the left aspect of the lower uterine segment so incision was made above that area.   Clear amniotic fluid  Vigorous male infant in OT presentation. Apgars 8 at 1 minute & 9 at 5 minutes. Weight 3260.  Normal uterus, fallopian tubes, and ovaries.     See operative report for full details.        Postpartum Course:   The patient's postoperative course was unremarkable.  She recovered as anticipated and experienced no post-operative complications. On discharge, her pain was well controlled. Vaginal bleeding was less than or similar to peak menstrual flow. She was spontaneously voiding without difficulty, ambulating well without dizziness, tolerating a normal diet without nausea or vomiting, and passing flatus. She was afebrile with a well-appearing incision. She declined birth control. Rhogam was not indicated. She was discharged on postpartum day #2.    Postpartum hemoglobin:   Hemoglobin   Date Value Ref Range Status   10/10/2024 8.6 (L) 11.7 - 15.7 g/dL Final        Discharge Instructions and Follow-Up:     Discharge diet: Regular   Discharge activity: No lifting greater than 15 lbs, pushing, pulling, or other strenuous activity for 6 weeks. Pelvic rest for 6 weeks including no sexual intercourse, tampons, or douching. No driving while taking narcotic pain medications and until you can slam on the breaks without pain.   Call for temperature greater than 100.4 F, heavy vaginal bleeding, worsening abdominal pain, inability to avoid, nausea or vomiting, or foul smelling vaginal discharge.   Discharge follow-up: 6 week routine postpartum visit with primary OB   Wound care: Keep incision clean and dry. No soaking in bath tub or vigorous scrubbing of incision site.        Discharge Disposition:   Discharged to home in stable condition.    Diana Larson MD  Obstetrics & Gynecology, PGY-2  10/11/2024 10:17 AM

## 2024-10-10 NOTE — PLAN OF CARE
Goal Outcome Evaluation:      Plan of Care Reviewed With: patient    Overall Patient Progress: improvingOverall Patient Progress: improving    Data: Vital signs, postpartum assessments WNL. Pt is voiding without difficulty, up ambulating ad kirby, eating and drinking without nausea. Lochia and fundal checks WNL, no s/s infection. Breastfeeding infant. Reports good pain management, relieved with tylenol and toradol.  Action: Education provided on discharge goals, plan of care, pain management, and breast feeding/latching help.  Response: Pt is agreeable with plan of care.  present and supportive. Plan of care ongoing, no concerns as of present.

## 2024-10-11 VITALS
TEMPERATURE: 97.8 F | RESPIRATION RATE: 18 BRPM | BODY MASS INDEX: 28.33 KG/M2 | OXYGEN SATURATION: 99 % | HEART RATE: 81 BPM | SYSTOLIC BLOOD PRESSURE: 119 MMHG | DIASTOLIC BLOOD PRESSURE: 70 MMHG | WEIGHT: 165.03 LBS

## 2024-10-11 PROCEDURE — 250N000013 HC RX MED GY IP 250 OP 250 PS 637

## 2024-10-11 RX ORDER — IBUPROFEN 600 MG/1
600 TABLET, FILM COATED ORAL EVERY 6 HOURS PRN
Qty: 60 TABLET | Refills: 0 | Status: SHIPPED | OUTPATIENT
Start: 2024-10-11

## 2024-10-11 RX ORDER — ACETAMINOPHEN 325 MG/1
650 TABLET ORAL EVERY 6 HOURS PRN
Qty: 100 TABLET | Refills: 0 | Status: SHIPPED | OUTPATIENT
Start: 2024-10-11

## 2024-10-11 RX ORDER — OXYCODONE HYDROCHLORIDE 5 MG/1
5 TABLET ORAL EVERY 6 HOURS PRN
Qty: 4 TABLET | Refills: 0 | OUTPATIENT
Start: 2024-10-11

## 2024-10-11 RX ORDER — AMOXICILLIN 250 MG
1 CAPSULE ORAL DAILY
Qty: 100 TABLET | Refills: 0 | Status: SHIPPED | OUTPATIENT
Start: 2024-10-11

## 2024-10-11 RX ADMIN — SENNOSIDES AND DOCUSATE SODIUM 2 TABLET: 50; 8.6 TABLET ORAL at 08:29

## 2024-10-11 RX ADMIN — IBUPROFEN 800 MG: 800 TABLET, FILM COATED ORAL at 11:32

## 2024-10-11 RX ADMIN — ACETAMINOPHEN 975 MG: 325 TABLET, FILM COATED ORAL at 05:20

## 2024-10-11 RX ADMIN — IBUPROFEN 800 MG: 800 TABLET, FILM COATED ORAL at 05:20

## 2024-10-11 RX ADMIN — ACETAMINOPHEN 975 MG: 325 TABLET, FILM COATED ORAL at 11:32

## 2024-10-11 ASSESSMENT — ACTIVITIES OF DAILY LIVING (ADL)
ADLS_ACUITY_SCORE: 18

## 2024-10-11 NOTE — PROGRESS NOTES
[facility]  Postpartum Progress Note    S: She is resting comfortably in bed this morning without complaints or concerns. Patient notes significant improvement compared to prior, and is interested in going home sooner than originally planned. Pain is improving and well controlled on current medication regimen. She is tolerating PO intake. She is voiding spontaneously without difficulty. She is passing flatus. She is ambulating without dizziness. Her lochia is appropriate. She has no symptoms of headache, changes in vision, nausea/vomiting, chest pain, shortness of breath, RUQ pain, fevers, or chills. She is breast feeding.    O:  Patient Vitals for the past 24 hrs:   BP Temp Temp src Pulse Resp SpO2   10/11/24 0917 119/70 97.8  F (36.6  C) Oral 81 18 --   10/10/24 2300 123/75 98.2  F (36.8  C) Oral 72 16 --   10/10/24 1614 -- -- -- -- -- 99 %   10/10/24 1611 119/67 98  F (36.7  C) Oral -- 16 99 %   10/10/24 1100 -- -- -- -- -- 99 %     Gen:  Resting comfortably, NAD  CV:   Regular rate, well perfused  Pulm:  Non-labored breathing on room air  Abd:  Soft, non-distended, appropriately tender to palpation. Fundus firm and non-tender.  Inc:  Clean, dry, intact with minimal soiling. No erythema, drainage, or induration.  Ext:  Non-tender, trace edema to bilateral lower extremities    I/O last 3 completed shifts:  In: -   Out: 600 [Urine:600]    A/P: 34 year old  POD#2 s/p RLTCS. Pregnancy notable for ICP. Meeting goals for discharge     # ICP  BA 23, LFTs normal antepartum. S/p Ursodiol.      # MRBP <4h  - Has been normotensive since    # Routine postpartum  # Acute blood loss anemia  Pain: Well-controlled with scheduled tylenol, toradol > ibuprofen, PRN oxy  Hgb:      11.3> EBL 1340 (mthg, garima, R O'[place])> 8.6. Will discharge with PO Fe.   GI:         Scheduled bowel regimen. PRN Simethicone.   :       S/p salinas; voiding spontaneously  Rh: Positive  Rub:      Immune  Hep B: Surface  antigen negative  Baby: In room, doing well  Feed:    Breast feeding  BC:       Patient not interested   PPx:      Encourage ambulation, IS, SCDs while confined to bed    Medically Ready for Discharge: Ready Now     Jimmy Waldron, MS3  [facility]    Resident/Fellow Attestation   I, Diana Larson, was present with the medical student who participated in the service and in the documentation of the note.  I have verified the history and personally performed the physical exam and medical decision making.  I agree with the assessment and plan of care as documented in the note.  I have reviewed the note and made changes as necessary.      Diana Larson MD  Obstetrics & Gynecology, PGY-2  10/11/2024 10:10 AM

## 2024-10-11 NOTE — DISCHARGE INSTRUCTIONS
Warning Signs after Having a Baby    Keep this paper on your fridge or somewhere else where you can see it.    Call your provider if you have any of these symptoms up to 12 weeks after having your baby.    Thoughts of hurting yourself or your baby  Pain in your chest or trouble breathing  Severe headache not helped by pain medicine  Eyesight concerns (blurry vision, seeing spots or flashes of light, other changes to eyesight)  Fainting, shaking or other signs of a seizure    Call 9-1-1 if you feel that it is an emergency.     The symptoms below can happen to anyone after giving birth. They can be very serious. Call your provider if you have any of these warning signs.    My provider s phone number: _______________________    Losing too much blood (hemorrhage)    Call your provider if you soak through a pad in less than an hour or pass blood clots bigger than a golf ball. These may be signs that you are bleeding too much.    Blood clots in the legs or lungs    After you give birth, your body naturally clots its blood to help prevent blood loss. Sometimes this increased clotting can happen in other areas of the body, like the legs or lungs. This can block your blood flow and be very dangerous.     Call your provider if you:  Have a red, swollen spot on the back of your leg that is warm or painful when you touch it.   Are coughing up blood.     Infection    Call your provider if you have any of these symptoms:  Fever of 100.4 F (38 C) or higher.  Pain or redness around your stitches if you had an incision.   Any yellow, white, or green fluid coming from places where you had stitches or surgery.    Mood Problems (postpartum depression)    Many people feel sad or have mood changes after having a baby. But for some people, these mood swings are worse.     Call your provider right away if you feel so anxious or nervous that you can't care for yourself or your baby.    Preeclampsia (high blood pressure)    Even if you  didn't have high blood pressure when you were pregnant, you are at risk for the high blood pressure disease called preeclampsia. This risk can last up to 12 weeks after giving birth.     Call your provider if you have:   Pain on your right side under your rib cage  Sudden swelling in the hands and face    Remember: You know your body. If something doesn't feel right, get medical help.     For informational purposes only. Not to replace the advice of your health care provider. Copyright 2020 [facility]. All rights reserved. Clinically reviewed by Gabi Lemos, RNC-OB, MSN. [employer] [id number] - Rev .     Section: What to Expect at Home  Your Recovery     A  section, or , is surgery to deliver your baby through a cut that the doctor makes in your lower belly and uterus. The cut is called an incision.  You may have some pain in your lower belly and need pain medicine for 1 to 2 weeks. You can expect some vaginal bleeding for several weeks. You will probably need about 6 weeks to fully recover.  It's important to take it easy while the incision heals. Avoid heavy lifting, strenuous activities, and exercises that strain the belly muscles while you recover. Ask a family member or friend for help with housework, cooking, and shopping.  This care sheet gives you a general idea about how long it will take for you to recover. But each person recovers at a different pace. Follow the steps below to get better as quickly as possible.  How can you care for yourself at home?  Activity    Rest when you feel tired. Getting enough sleep will help you recover.     Try to walk each day. Start by walking a little more than you did the day before. Bit by bit, increase the amount you walk. Walking boosts blood flow and helps prevent pneumonia, constipation, and blood clots.     Avoid strenuous activities, such as bicycle riding, jogging, weightlifting, and aerobic exercise, for 6 weeks or until  your doctor says it is okay.     Until your doctor says it is okay, do not lift anything heavier than your baby.     Do not do sit-ups or other exercises that strain the belly muscles for 6 weeks or until your doctor says it is okay.     Hold a pillow over your incision when you cough or take deep breaths. This will support your belly and decrease your pain.     You may shower as usual. Pat the incision dry when you are done.     You will have some vaginal bleeding. Wear sanitary pads. Do not douche or use tampons until your doctor says it is okay.     Ask your doctor when you can drive again.     You will probably need to take at least 6 weeks off work. It depends on the type of work you do and how you feel.     Ask your doctor when it is okay for you to have sex.   Diet    You can eat your normal diet. If your stomach is upset, try bland, low-fat foods like plain rice, broiled chicken, toast, and yogurt.     Drink plenty of fluids (unless your doctor tells you not to).     You may notice that your bowel movements are not regular right after your surgery. This is common. Try to avoid constipation and straining with bowel movements. You may want to take a fiber supplement every day. If you have not had a bowel movement after a couple of days, ask your doctor about taking a mild laxative.     If you are breastfeeding, limit alcohol. Alcohol can cause a lack of energy and other health problems for the baby when a breastfeeding woman drinks heavily. It can also get in the way of a mom's ability to feed her baby or to care for the child in other ways. There isn't a lot of research about exactly how much alcohol can harm a baby. Having no alcohol is the safest choice for your baby. If you choose to have a drink now and then, have only one drink, and limit the number of occasions that you have a drink. Wait to breastfeed at least 2 hours after you have a drink to reduce the amount of alcohol the baby may get in the milk.    Medicines    Your doctor will tell you if and when you can restart your medicines. You will also get instructions about taking any new medicines.     If you stopped taking aspirin or some other blood thinner, your doctor will tell you when to start taking it again.     Take pain medicines exactly as directed.  If the doctor gave you a prescription medicine for pain, take it as prescribed.  If you are not taking a prescription pain medicine, ask your doctor if you can take an over-the-counter medicine.     If you think your pain medicine is making you sick to your stomach:  Take your medicine after meals (unless your doctor has told you not to).  Ask your doctor for a different pain medicine.     If your doctor prescribed antibiotics, take them as directed. Do not stop taking them just because you feel better. You need to take the full course of antibiotics.   Incision care    If you have strips of tape on the incision, leave the tape on for a week or until it falls off.     Wash the area daily with warm, soapy water, and pat it dry. Don't use hydrogen peroxide or alcohol, which can slow healing. You may cover the area with a gauze bandage if it weeps or rubs against clothing. Change the bandage every day.     Keep the area clean and dry.   Other instructions    If you breastfeed your baby, you may be more comfortable while you are healing if you don't rest your baby on your belly. Try tucking your baby under your arm, with your baby's body along the side you will be feeding on. Support your baby's upper body with your arm. With that hand you can control your baby's head to bring your baby's mouth to your breast. This is sometimes called the football hold.   Follow-up care is a key part of your treatment and safety. Be sure to make and go to all appointments, and call your doctor if you are having problems. It's also a good idea to know your test results and keep a list of the medicines you take.  When should you  call for help?  Share this information with your partner, family, or a friend. They can help you watch for warning signs.  Call 911  anytime you think you may need emergency care. For example, call if:    You feel you cannot stop from hurting yourself, your baby, or someone else.     You passed out (lost consciousness).     You have chest pain, are short of breath, or cough up blood.     You have a seizure.   Where to get help 24 hours a day, 7 days a week   If you or someone you know talks about suicide, self-harm, a mental health crisis, a substance use crisis, or any other kind of emotional distress, get help right away. You can:    Call the Suicide and Crisis Lifeline at 988.     Call 2-321-266-TALK (1-904.392.6002).     Text HOME to 235541 to access the Crisis Text Line.   Consider saving these numbers in your phone.  Go to [employer] for more information or to chat online.  Call your doctor or midwife now or seek immediate medical care if:    You have loose stitches, or your incision comes open.     You have signs of hemorrhage (too much bleeding), such as:  Heavy vaginal bleeding. This means that you are soaking through one or more pads in an hour. Or you pass blood clots bigger than an egg.  Feeling dizzy or lightheaded, or you feel like you may faint.  Feeling so tired or weak that you cannot do your usual activities.  A fast or irregular heartbeat.  New or worse belly pain.     You have symptoms of infection, such as:  Increased pain, swelling, warmth, or redness.  Red streaks leading from the incision.  Pus draining from the incision.  A fever.  Frequent or painful urination or blood in your urine.  Vaginal discharge that smells bad.  New or worse belly pain.     You have symptoms of a blood clot in your leg (called a deep vein thrombosis), such as:  Pain in the calf, back of the knee, thigh, or groin.  Swelling in the leg or groin.  A color change on the leg or groin. The skin may be reddish or  "purplish, depending on your usual skin color.     You have signs of preeclampsia, such as:  Sudden swelling of your face, hands, or feet.  New vision problems (such as dimness, blurring, or seeing spots).  A severe headache.     You have signs of heart failure, such as:  New or increased shortness of breath.  New or worse swelling in your legs, ankles, or feet.  Sudden weight gain, such as more than 2 to 3 pounds in a day or 5 pounds in a week.  Feeling so tired or weak that you cannot do your usual activities.     You had spinal or epidural pain relief and have:  New or worse back pain.  Increased pain, swelling, warmth, or redness at the injection site.  Tingling, weakness, or numbness in your legs or groin.   Watch closely for changes in your health, and be sure to contact your doctor or midwife if:    Your vaginal bleeding isn't decreasing.     You feel sad, anxious, or hopeless for more than a few days.     You are having problems with your breasts or breastfeeding.   Where can you learn more?  Go to https://www.[employer].net/patiented  Enter M806 in the search box to learn more about \" Section: What to Expect at Home.\"  Current as of: July 10, 2023  Content Version: 2024 [facility] [employer].   Care instructions adapted under license by your healthcare professional. If you have questions about a medical condition or this instruction, always ask your healthcare professional. [employer] disclaims any warranty or liability for your use of this information.  After Your Delivery (the Postpartum Period): Care Instructions  Overview     After childbirth (postpartum period), your body goes through many changes as you recover. In these weeks after delivery, try to take good care of yourself. Get rest whenever you can and accept help from others.  It may take 4 to 6 weeks to feel like yourself again, and possibly longer if you had a  birth. You may feel sore or very tired as you " "recover. After delivery, you may continue to have contractions as the uterus returns to the size it was before your pregnancy. You will also have some vaginal bleeding. And you may have pain around the vagina as you heal. Several days after delivery you may also have pain and swelling in your breasts as they fill with milk. There are things you can do at home to help ease these discomforts.  After childbirth, it's common to feel emotional. You may feel irritable, cry easily, and feel happy one minute and sad the next. This is called the \"baby blues.\" Hormone changes are one cause of these emotional changes. These feelings usually get better within a couple of weeks. If they don't, talk to your doctor or midwife.  In the first couple of weeks after you give birth, your doctor or midwife may want to check in with you and make a plan for follow-up care. You will likely have a complete postpartum visit in the first 3 months after delivery. At that time, your doctor or midwife will check on your recovery and see how you're doing. But if you have questions or concerns before then, you can always call your doctor or midwife.  Follow-up care is a key part of your treatment and safety. Be sure to make and go to all appointments, and call your doctor if you are having problems. It's also a good idea to know your test results and keep a list of the medicines you take.  How can you care for yourself at home?  Taking care of your body  Use pads instead of tampons for bleeding. After birth, you will have bloody vaginal discharge. You may also pass some blood clots that shouldn't be bigger than an egg. Over the next 6 weeks or so, your bleeding should decrease a little every day and slowly change to a pinkish and then whitish discharge.  For cramps or mild pain, try an over-the-counter pain medicine, such as acetaminophen (Tylenol) or ibuprofen (Advil, Motrin). Read and follow all instructions on the label.  To ease pain around " the vagina or from hemorrhoids:  Put ice or a cold pack on the area for 10 to 20 minutes at a time. Put a thin cloth between the ice and your skin.  Try sitting in a few inches of warm water (sitz bath) when you can or after bowel movements.  Clean yourself with a gentle squeeze of warm water from a bottle instead of wiping with toilet paper.  Use witch hazel or hemorrhoid pads (such as Tucks).  Try using a cold compress for sore and swollen breasts. And wear a supportive bra that fits.  Ease constipation by drinking plenty of fluids and eating high-fiber foods. Ask your doctor or midwife about over-the-counter stool softeners.  Activity  Rest when you can.  Ask for help from family or friends when you need it.  If you can, have another adult in your home for at least 2 or 3 days after birth.  When you feel ready, try to get some exercise every day. For many people, walking is a good choice. Don't do any heavy exercise until your doctor or midwife says it's okay.  Ask your doctor or midwife when it is okay to have vaginal sex.  If you don't want to get pregnant, talk to your doctor or midwife about birth control options. You can get pregnant even before your period returns. Also, you can get pregnant while you are breastfeeding.  Talk to your doctor or midwife if you want to get pregnant again. They can talk to you about when it is safe.  Emotional health  It's normal to have some sadness, anxiety, and mood swings after delivery. It may help to talk with a trusted friend or family member. You can also call the Maternal Mental Health Hotline at [facility] (1-845.912.8028) for support. If these mood changes last more than a couple of weeks, talk to your doctor or midwife.  When should you call for help?  Share this information with your partner, family, or a friend. They can help you watch for warning signs.  Call 621  anytime you think you may need emergency care. For example, call if:    You feel you cannot stop  from hurting yourself, your baby, or someone else.     You passed out (lost consciousness).     You have chest pain, are short of breath, or cough up blood.     You have a seizure.   Where to get help 24 hours a day, 7 days a week   If you or someone you know talks about suicide, self-harm, a mental health crisis, a substance use crisis, or any other kind of emotional distress, get help right away. You can:    Call the Suicide and Crisis Lifeline at 848.     Call 5-440-331-TALK (1-871.853.5427).     Text HOME to 583860 to access the Crisis Text Line.   Consider saving these numbers in your phone.  Go to [employer] for more information or to chat online.  Call your doctor or midwife now or seek immediate medical care if:    You have signs of hemorrhage (too much bleeding), such as:  Heavy vaginal bleeding. This means that you are soaking through one or more pads in an hour. Or you pass blood clots bigger than an egg.  Feeling dizzy or lightheaded, or you feel like you may faint.  Feeling so tired or weak that you cannot do your usual activities.  A fast or irregular heartbeat.  New or worse belly pain.     You have signs of infection, such as:  A fever.  Increased pain, swelling, warmth, or redness from an incision or wound.  Frequent or painful urination or blood in your urine.  Vaginal discharge that smells bad.  New or worse belly pain.     You have symptoms of a blood clot in your leg (called a deep vein thrombosis), such as:  Pain in the calf, back of the knee, thigh, or groin.  Swelling in the leg or groin.  A color change on the leg or groin. The skin may be reddish or purplish, depending on your usual skin color.     You have signs of preeclampsia, such as:  Sudden swelling of your face, hands, or feet.  New vision problems (such as dimness, blurring, or seeing spots).  A severe headache.     You have signs of heart failure, such as:  New or increased shortness of breath.  New or worse swelling in your  "legs, ankles, or feet.  Sudden weight gain, such as more than 2 to 3 pounds in a day or 5 pounds in a week.  Feeling so tired or weak that you cannot do your usual activities.     You had spinal or epidural pain relief and have:  New or worse back pain.  Increased pain, swelling, warmth, or redness at the injection site.  Tingling, weakness, or numbness in your legs or groin.   Watch closely for changes in your health, and be sure to contact your doctor or midwife if:    Your vaginal bleeding isn't decreasing.     You feel sad, anxious, or hopeless for more than a few days.     You are having problems with your breasts or breastfeeding.   Where can you learn more?  Go to https://www.[employer].net/patiented  Enter A461 in the search box to learn more about \"After Your Delivery (the Postpartum Period): Care Instructions.\"  Current as of: July 10, 2023  Content Version: 14.2 2024 [facility] [employer].   Care instructions adapted under license by your healthcare professional. If you have questions about a medical condition or this instruction, always ask your healthcare professional. Healthwise[employer] Incorporated disclaims any warranty or liability for your use of this information.    "

## 2024-10-11 NOTE — PLAN OF CARE
Vital signs within normal limits. Postpartum checks within normal limits - see flow record. Patient eating and drinking normally. Patient able to empty bladder independently and is up ambulating. No apparent signs of infection. Incision healing well. Patient performing self cares and is able to care for infant.  Patient medicated during the shift for pain and cramping. See MAR.   Positive attachment behaviors observed with infant.

## 2024-10-11 NOTE — PLAN OF CARE
Goal Outcome Evaluation:      Plan of Care Reviewed With: patient    Overall Patient Progress: improvingOverall Patient Progress: improving    Outcome Evaluation: Adequate for Discharge      Problem: Postpartum ( Delivery)  Goal: Successful Parent Role Transition  Intervention: Support Parent Role Transition  Recent Flowsheet Documentation  Taken 10/11/2024 0917 by Radha Harper RN  Supportive Measures:   active listening utilized   positive reinforcement provided   self-care encouraged   verbalization of feelings encouraged  Parent-Child Attachment Promotion:   caring behavior modeled   cue recognition promoted   face-to-face positioning promoted   interaction encouraged   parent/caregiver presence encouraged   participation in care promoted   positive reinforcement provided   rooming-in promoted   skin-to-skin contact encouraged   strengths emphasized  Data: Vital signs within normal limits. Postpartum checks within normal limits - see flow record. Patient eating and drinking normally. Patient able to empty bladder independently and is up ambulating. No apparent signs of infection. Incision healing well. Patient performing self cares and is able to care for infant. Breastfeeding baby as well as supplementing with formula per feeding plan.   Action: Pain has been adequately managed with oral medications. Discharge medications and discharge instructions reviewed with patient, reviewed need for follow-up in the clinic in 2 weeks and 6 weeks for postpartum checks. All questions answered, no further concerns. Copy of AVS given. ID bands checked with baby.   Response: Positive attachment behaviors observed with infant. Support person, : Jorge, present.   Plan: Patient discharged to home at 1410.

## 2024-10-11 NOTE — PROGRESS NOTES
[facility]    Post-partum progress note     Jennifer Felipe is a(n) 34 year old  on post op day 2 s/p repeat CS which was a high transverse uterine incision due to intraabdominal adhesions.   She reports lochia is normal, tolerating a regular diet, ambulating well, adequate pain control with oral pain medication, and voiding without difficulty.  She has no complaints.  She is Breast feeding. She was initially planning to stay until POD#3 but is actually feeling very well this AM and meeting all post-op milestones so is planning for discharge this afternoon. Itching much improved since delivery.          Physical Exam:      Patient Vitals for the past 24 hrs:   BP Temp Temp src Pulse Resp SpO2   10/11/24 0917 119/70 97.8  F (36.6  C) Oral 81 18 --   10/10/24 2300 123/75 98.2  F (36.8  C) Oral 72 16 --   10/10/24 1614 -- -- -- -- -- 99 %   10/10/24 1611 119/67 98  F (36.7  C) Oral -- 16 99 %   10/10/24 1100 -- -- -- -- -- 99 %     Temp (48hrs), Av  F (36.7  C), Min:97.4  F (36.3  C), Max:98.7  F (37.1  C)      General: Patient alert and oriented, no acute distress  CV: no peripheral edema or cyanosis  Resp: normal respiratory effort and equal lung expansion  Abdominal exam: deferred as patient breastfeeding during rounds but incision and fundal checks wnl per nursing documentation.   Ext: non-tender, trace edema    Labs:   Recent Labs   Lab 10/10/24  0635 10/07/24  1635   HGB 8.6* 11.3*         Assessment/Plan:    Jennifer Felipe is a 34 year old  who is POD#2 s/p repeat CS (high transverse)     1. AFVSS  2. Rh+/RI  3. Pain: well controlled with po medication  4. PPBC: Declines, will further discuss at post-partum visit   5. Routine post-op cares  -Encourage ambulation  -Lactation support PRN  -Post-op hemoglobin dropped to 8.6 (appropriate decrease given intra-operative blood loss), patient asymptomatic and IV already removed so will discharge on PO iron and  repeat hemoglobin at post-partum visit   6. Cholestasis of pregnancy   -Bile acids with a max of 339, most recently 23 prior to delivery with normal LFTs prior to delivery. Symptomatically improved so can stop ursodiol and will plan to repeat labs at PP visit.      Medically Ready for Discharge: Anticipated Today      10/11/2024 10:13 AM Mirlande Harrison MD

## 2024-10-12 ENCOUNTER — NURSE TRIAGE (OUTPATIENT)
Dept: NURSING | Facility: CLINIC | Age: 34
End: 2024-10-12
Payer: COMMERCIAL

## 2024-10-12 NOTE — TELEPHONE ENCOUNTER
Csection 10/9/24. This morning found blood : a little bit on one of her stitches. 1-2 drops bright red blood. It appears to have stopped.     Triaged to a disposition of Home Care; will monitor for further bleeding, or other sx.      Suzanne Jamison RN Triage Nurse Advisor 9:53 AM 10/12/2024  Additional Information   Negative: [1] Wound gaping open AND [2] visible internal organs   Negative: Sounds like a life-threatening emergency to the triager   Negative: [1] Bleeding from incision AND [2] won't stop after 10 minutes of direct pressure (using correct technique)   Negative: [1] Widespread rash AND [2] bright red, sunburn-like   Negative: Severe pain in the incision   Negative: [1] Incision gaping open AND [2] < 48 hours since wound re-opened   Negative: [1] Incision gaping open AND [2] length of opening > 2 inches (5 cm)   Negative: Patient sounds very sick or weak to the triager   Negative: Incision looks infected (spreading redness, pain)   Negative: [1] Incision looks infected (spreading redness, pain) AND [2] large red area (> 2 in. or 5 cm)   Negative: Pus or bad-smelling fluid draining from incision   Negative: Fever 100.4 F (38.0 C) or higher   Negative: Dressing soaked with blood or body fluid (e.g., drainage)   Negative: [1] Caller has URGENT question AND [2] triager unable to answer question   Negative: Suture or staple removal is overdue   Negative: [1] Small red area or streak AND [2] no fever   Negative: [1] Clear or blood-tinged fluid draining from wound AND [2] no fever   Negative: [1] 48 hours since surgery AND [2] wound is becoming more tender   Negative: [1] Incision gaping open AND [2] > 48 hours since wound re-opened AND [3] length of opening > 1/2 inch (12 mm)   Negative: [1] MILD to MODERATE post-op pain (e.g., pain scale 1-7) AND [2]  not relieved with pain medication   Negative: [1] Caller has NON-URGENT question AND [2] triager unable to answer question   Negative: Pimple where a stitch  comes through the skin   Negative: Suture or staple came out early   Negative: Suture removal date, questions about   Negative: Skin tape (e.g., Steri-Strips) removal, questions about   Negative: Sutured or stapled surgical incision, questions about   Negative: Surgical incision after sutures or staples removed, questions about    Protocols used: Postpartum -  Incision Symptoms-A-AH

## 2024-11-26 ENCOUNTER — PRENATAL OFFICE VISIT (OUTPATIENT)
Dept: OBGYN | Facility: CLINIC | Age: 34
End: 2024-11-26
Payer: COMMERCIAL

## 2024-11-26 VITALS
WEIGHT: 159 LBS | DIASTOLIC BLOOD PRESSURE: 72 MMHG | TEMPERATURE: 97.2 F | BODY MASS INDEX: 27.29 KG/M2 | SYSTOLIC BLOOD PRESSURE: 108 MMHG | HEART RATE: 76 BPM

## 2024-11-26 DIAGNOSIS — Z12.4 SCREENING FOR CERVICAL CANCER: ICD-10-CM

## 2024-11-26 DIAGNOSIS — O26.649: ICD-10-CM

## 2024-11-26 LAB
ALT SERPL W P-5'-P-CCNC: 41 U/L (ref 0–50)
AST SERPL W P-5'-P-CCNC: 32 U/L (ref 0–45)
ERYTHROCYTE [DISTWIDTH] IN BLOOD BY AUTOMATED COUNT: 12.8 % (ref 10–15)
FERRITIN SERPL-MCNC: 25 NG/ML (ref 6–175)
HCT VFR BLD AUTO: 34.5 % (ref 35–47)
HGB BLD-MCNC: 11.1 G/DL (ref 11.7–15.7)
MCH RBC QN AUTO: 25.3 PG (ref 26.5–33)
MCHC RBC AUTO-ENTMCNC: 32.2 G/DL (ref 31.5–36.5)
MCV RBC AUTO: 79 FL (ref 78–100)
PLATELET # BLD AUTO: 275 10E3/UL (ref 150–450)
RBC # BLD AUTO: 4.39 10E6/UL (ref 3.8–5.2)
WBC # BLD AUTO: 6.1 10E3/UL (ref 4–11)

## 2024-11-26 PROCEDURE — 87624 HPV HI-RISK TYP POOLED RSLT: CPT | Performed by: OBSTETRICS & GYNECOLOGY

## 2024-11-26 PROCEDURE — 82728 ASSAY OF FERRITIN: CPT | Performed by: OBSTETRICS & GYNECOLOGY

## 2024-11-26 PROCEDURE — 85027 COMPLETE CBC AUTOMATED: CPT | Performed by: OBSTETRICS & GYNECOLOGY

## 2024-11-26 PROCEDURE — 84450 TRANSFERASE (AST) (SGOT): CPT | Performed by: OBSTETRICS & GYNECOLOGY

## 2024-11-26 PROCEDURE — 99207 PR POST PARTUM EXAM: CPT | Performed by: OBSTETRICS & GYNECOLOGY

## 2024-11-26 PROCEDURE — 99000 SPECIMEN HANDLING OFFICE-LAB: CPT | Performed by: OBSTETRICS & GYNECOLOGY

## 2024-11-26 PROCEDURE — 36415 COLL VENOUS BLD VENIPUNCTURE: CPT | Performed by: OBSTETRICS & GYNECOLOGY

## 2024-11-26 PROCEDURE — 84460 ALANINE AMINO (ALT) (SGPT): CPT | Performed by: OBSTETRICS & GYNECOLOGY

## 2024-11-26 PROCEDURE — 82239 BILE ACIDS TOTAL: CPT | Mod: 90 | Performed by: OBSTETRICS & GYNECOLOGY

## 2024-11-26 RX ORDER — PRENATAL VIT/IRON FUM/FOLIC AC 27MG-0.8MG
1 TABLET ORAL DAILY
Qty: 90 TABLET | Refills: 3 | Status: SHIPPED | OUTPATIENT
Start: 2024-11-26

## 2024-11-26 RX ORDER — CHOLECALCIFEROL (VITAMIN D3) 50 MCG
1 TABLET ORAL DAILY
Qty: 100 TABLET | Refills: 3 | Status: SHIPPED | OUTPATIENT
Start: 2024-11-26

## 2024-11-26 ASSESSMENT — PATIENT HEALTH QUESTIONNAIRE - PHQ9
SUM OF ALL RESPONSES TO PHQ QUESTIONS 1-9: 4
5. POOR APPETITE OR OVEREATING: NOT AT ALL

## 2024-11-26 ASSESSMENT — ANXIETY QUESTIONNAIRES
3. WORRYING TOO MUCH ABOUT DIFFERENT THINGS: NOT AT ALL
1. FEELING NERVOUS, ANXIOUS, OR ON EDGE: NOT AT ALL
GAD7 TOTAL SCORE: 2
2. NOT BEING ABLE TO STOP OR CONTROL WORRYING: NOT AT ALL
7. FEELING AFRAID AS IF SOMETHING AWFUL MIGHT HAPPEN: NOT AT ALL
6. BECOMING EASILY ANNOYED OR IRRITABLE: MORE THAN HALF THE DAYS
GAD7 TOTAL SCORE: 2
5. BEING SO RESTLESS THAT IT IS HARD TO SIT STILL: NOT AT ALL

## 2024-11-26 NOTE — PROGRESS NOTES
SUBJECTIVE:  Jennifer Felipe is a 34 year old female   here for a postpartum visit.  She had a repeat  Section  on 10/9/24 delivering a healthy baby boy weighing at term.      delivery complications:  No  breast feeding:  Yes, going well.  bladder problems:  No  bowel problems/hemorrhoids:  No sometimes gets gas and bloating, but that is not uncommon for her  episiotomy/laceration/incision healed? Yes   vaginal flow:  None  contraception:  condoms  emotional adjustment:  doing well, happy, and tired          2024     1:35 PM   PHQ   PHQ-9 Total Score 4   Q9: Thoughts of better off dead/self-harm past 2 weeks Not at all           2024     1:35 PM   MIMA-7 SCORE   Total Score 2           OBJECTIVE:  Blood pressure 108/72, pulse 76, temperature 97.2  F (36.2  C), weight 72.1 kg (159 lb), last menstrual period 2024, currently breastfeeding.   General - pleasant female in no acute distress.  normal respiratory and cardiovascular effort   Abdomen - soft, nontender, nondistended, no hepatosplenomegaly.  Incision well healed   Pelvic - EG: normal adult female, BUS: within normal limits, Vagina: well rugated, no discharge, Cervix: no lesions or CMT, Uterus: firm, normal sized and nontender, Adnexae: no masses or tenderness.  Rectovaginal - deferred.    ASSESSMENT:  (Z39.2) Routine postpartum follow-up  (primary encounter diagnosis)  Comment:    Plan: CBC with platelets, Ferritin             (O26.649) Intrahepatic cholestasis of pregnancy, delivered, curr hospitalization  Comment:    Plan: Bile acids total, ALT, AST           (Z12.4) Screening for cervical cancer  Comment:    Plan: HPV and Gynecologic Cytology Panel -         Recommended Age 30-65 Years     May resume normal activities without restrictions  Pap smear was  done today     Reviewed contraception, family planning and breast feeding expectations.     They are not planning more children.  Culturally 2 children is plenty and they have a  boy and girl.     discussed High transverse  section on 10/9/24, so would need to Deliver any future pregnancy at 37 wks     The patient will use condoms for birth control. May eventually consider tubal ligation   Return to clinic in one year for an annual, when due for a pap smear or PRN.    Audelia Ozuna MD

## 2024-11-27 LAB
HPV HR 12 DNA CVX QL NAA+PROBE: NEGATIVE
HPV16 DNA CVX QL NAA+PROBE: NEGATIVE
HPV18 DNA CVX QL NAA+PROBE: NEGATIVE
HUMAN PAPILLOMA VIRUS FINAL DIAGNOSIS: NORMAL

## 2024-11-28 LAB — BILE AC SERPL-SCNC: 11 UMOL/L

## 2024-12-03 LAB
BKR AP ASSOCIATED HPV REPORT: NORMAL
BKR LAB AP GYN ADEQUACY: NORMAL
BKR LAB AP GYN INTERPRETATION: NORMAL
BKR LAB AP PREVIOUS ABNORMAL: NORMAL
PATH REPORT.COMMENTS IMP SPEC: NORMAL
PATH REPORT.COMMENTS IMP SPEC: NORMAL
PATH REPORT.RELEVANT HX SPEC: NORMAL

## 2024-12-12 ENCOUNTER — MEDICAL CORRESPONDENCE (OUTPATIENT)
Dept: HEALTH INFORMATION MANAGEMENT | Facility: CLINIC | Age: 34
End: 2024-12-12
Payer: COMMERCIAL

## 2025-01-19 ENCOUNTER — HEALTH MAINTENANCE LETTER (OUTPATIENT)
Age: 35
End: 2025-01-19

## 2025-02-17 ENCOUNTER — MEDICAL CORRESPONDENCE (OUTPATIENT)
Dept: HEALTH INFORMATION MANAGEMENT | Facility: CLINIC | Age: 35
End: 2025-02-17
Payer: COMMERCIAL

## 2025-04-17 ENCOUNTER — MEDICAL CORRESPONDENCE (OUTPATIENT)
Dept: HEALTH INFORMATION MANAGEMENT | Facility: CLINIC | Age: 35
End: 2025-04-17
Payer: COMMERCIAL

## 2025-07-15 ENCOUNTER — OFFICE VISIT (OUTPATIENT)
Dept: FAMILY MEDICINE | Facility: CLINIC | Age: 35
End: 2025-07-15
Payer: COMMERCIAL

## 2025-07-15 VITALS
TEMPERATURE: 97 F | BODY MASS INDEX: 28.3 KG/M2 | WEIGHT: 159.7 LBS | HEART RATE: 101 BPM | HEIGHT: 63 IN | RESPIRATION RATE: 22 BRPM | DIASTOLIC BLOOD PRESSURE: 62 MMHG | OXYGEN SATURATION: 99 % | SYSTOLIC BLOOD PRESSURE: 104 MMHG

## 2025-07-15 DIAGNOSIS — Z13.6 SCREENING FOR CARDIOVASCULAR CONDITION: ICD-10-CM

## 2025-07-15 DIAGNOSIS — H92.01 RIGHT EAR PAIN: ICD-10-CM

## 2025-07-15 DIAGNOSIS — Z13.1 SCREENING FOR DIABETES MELLITUS: ICD-10-CM

## 2025-07-15 DIAGNOSIS — Z00.00 ROUTINE GENERAL MEDICAL EXAMINATION AT A HEALTH CARE FACILITY: Primary | ICD-10-CM

## 2025-07-15 DIAGNOSIS — R22.1 NECK SWELLING: ICD-10-CM

## 2025-07-15 DIAGNOSIS — Z13.0 SCREENING FOR DEFICIENCY ANEMIA: ICD-10-CM

## 2025-07-15 LAB
CHOLEST SERPL-MCNC: 182 MG/DL
ERYTHROCYTE [DISTWIDTH] IN BLOOD BY AUTOMATED COUNT: 14.9 % (ref 10–15)
EST. AVERAGE GLUCOSE BLD GHB EST-MCNC: 123 MG/DL
FASTING STATUS PATIENT QL REPORTED: YES
FERRITIN SERPL-MCNC: 36 NG/ML (ref 6–175)
HBA1C MFR BLD: 5.9 % (ref 0–5.6)
HCT VFR BLD AUTO: 39.2 % (ref 35–47)
HDLC SERPL-MCNC: 42 MG/DL
HGB BLD-MCNC: 12.3 G/DL (ref 11.7–15.7)
HOLD SPECIMEN: NORMAL
IRON BINDING CAPACITY (ROCHE): 334 UG/DL (ref 240–430)
IRON SATN MFR SERPL: 20 % (ref 15–46)
IRON SERPL-MCNC: 66 UG/DL (ref 37–145)
LDLC SERPL CALC-MCNC: 115 MG/DL
MCH RBC QN AUTO: 23.8 PG (ref 26.5–33)
MCHC RBC AUTO-ENTMCNC: 31.4 G/DL (ref 31.5–36.5)
MCV RBC AUTO: 76 FL (ref 78–100)
NONHDLC SERPL-MCNC: 140 MG/DL
PLATELET # BLD AUTO: 261 10E3/UL (ref 150–450)
RBC # BLD AUTO: 5.17 10E6/UL (ref 3.8–5.2)
TRIGL SERPL-MCNC: 126 MG/DL
WBC # BLD AUTO: 6.5 10E3/UL (ref 4–11)

## 2025-07-15 PROCEDURE — 3074F SYST BP LT 130 MM HG: CPT | Performed by: FAMILY MEDICINE

## 2025-07-15 PROCEDURE — 90471 IMMUNIZATION ADMIN: CPT | Performed by: FAMILY MEDICINE

## 2025-07-15 PROCEDURE — 90746 HEPB VACCINE 3 DOSE ADULT IM: CPT | Performed by: FAMILY MEDICINE

## 2025-07-15 PROCEDURE — 3044F HG A1C LEVEL LT 7.0%: CPT | Performed by: FAMILY MEDICINE

## 2025-07-15 PROCEDURE — 83540 ASSAY OF IRON: CPT | Performed by: FAMILY MEDICINE

## 2025-07-15 PROCEDURE — 1125F AMNT PAIN NOTED PAIN PRSNT: CPT | Performed by: FAMILY MEDICINE

## 2025-07-15 PROCEDURE — 99395 PREV VISIT EST AGE 18-39: CPT | Mod: 25 | Performed by: FAMILY MEDICINE

## 2025-07-15 PROCEDURE — 83036 HEMOGLOBIN GLYCOSYLATED A1C: CPT | Performed by: FAMILY MEDICINE

## 2025-07-15 PROCEDURE — 99213 OFFICE O/P EST LOW 20 MIN: CPT | Mod: 25 | Performed by: FAMILY MEDICINE

## 2025-07-15 PROCEDURE — 85027 COMPLETE CBC AUTOMATED: CPT | Performed by: FAMILY MEDICINE

## 2025-07-15 PROCEDURE — 82728 ASSAY OF FERRITIN: CPT | Performed by: FAMILY MEDICINE

## 2025-07-15 PROCEDURE — 80061 LIPID PANEL: CPT | Performed by: FAMILY MEDICINE

## 2025-07-15 PROCEDURE — 83550 IRON BINDING TEST: CPT | Performed by: FAMILY MEDICINE

## 2025-07-15 PROCEDURE — 3078F DIAST BP <80 MM HG: CPT | Performed by: FAMILY MEDICINE

## 2025-07-15 PROCEDURE — 3049F LDL-C 100-129 MG/DL: CPT | Performed by: FAMILY MEDICINE

## 2025-07-15 PROCEDURE — 36415 COLL VENOUS BLD VENIPUNCTURE: CPT | Performed by: FAMILY MEDICINE

## 2025-07-15 SDOH — HEALTH STABILITY: PHYSICAL HEALTH: ON AVERAGE, HOW MANY DAYS PER WEEK DO YOU ENGAGE IN MODERATE TO STRENUOUS EXERCISE (LIKE A BRISK WALK)?: 7 DAYS

## 2025-07-15 ASSESSMENT — SOCIAL DETERMINANTS OF HEALTH (SDOH): HOW OFTEN DO YOU GET TOGETHER WITH FRIENDS OR RELATIVES?: MORE THAN THREE TIMES A WEEK

## 2025-07-15 ASSESSMENT — PAIN SCALES - GENERAL: PAINLEVEL_OUTOF10: SEVERE PAIN (7)

## 2025-07-15 NOTE — PROGRESS NOTES
"Preventive Care Visit  [facility] Gordy La MD, MD, Family Medicine  Jul 15, 2025      Assessment & Plan     Routine general medical examination at a health care facility       Right ear pain  Persistent right ear pain and I see some TMJ area diffuse swelling.  I am going to obtain soft tissue ultrasound but I recommended her to see an ENT for further evaluation.  Both patient and  understood.  Referral given.  I am going to hold off on any other prescription at this point.  - US Head Neck Soft Tissue; Future  - Adult ENT  Referral; Future    Neck swelling  See above  - US Head Neck Soft Tissue; Future  - Adult ENT  Referral; Future    Screening for cardiovascular condition     - Lipid panel reflex to direct LDL Fasting; Future  - Lipid panel reflex to direct LDL Fasting    Screening for diabetes mellitus      - HEMOGLOBIN A1C; Future  - HEMOGLOBIN A1C    Screening for deficiency anemia     - CBC with Platelets and Reflex to Iron Studies; Future  - CBC with Platelets and Reflex to Iron Studies  - Iron & Iron Binding Capacity  - Ferritin      BMI  Estimated body mass index is 28.07 kg/m  as calculated from the following:    Height as of this encounter: 1.607 m (5' 3.25\").    Weight as of this encounter: 72.4 kg (159 lb 11.2 oz).   Weight management plan: Discussed healthy diet and exercise guidelines    Counseling  Appropriate preventive services were addressed with this patient via screening, questionnaire, or discussion as appropriate for fall prevention, nutrition, physical activity, Tobacco-use cessation, social engagement, weight loss and cognition.  Checklist reviewing preventive services available has been given to the patient.  Reviewed patient's diet, addressing concerns and/or questions.   Reviewed preventive health counseling, as reflected in patient instructions        Jerry Rodriguez is a 35 year old, presenting for the " following:  Physical        7/15/2025    10:20 AM   Additional Questions   Roomed by Jenelle VALADEZ   Accompanied by  and two kids          HPI     Here with her  and 2 kids.  Trying to walk in the morning.   2 young kids. Some exercise for core muscles.   No medications.   Heaviness feelings around chest, some right ear pain, sometime sinus pressure on right side. No qtip use. No nose spray. Sometime   Mensis - did not come yet. 9 months since no period. Currently breast feeding. Been to obgyn.   Mother with heart attack. 60+ age.   In [place] seen by ENT for right ear infection. Loud noise bothers her.     Advance Care Planning    Patient states has Health Care Directive and will send to [facility].        7/15/2025   General Health   How would you rate your overall physical health? Good   Feel stress (tense, anxious, or unable to sleep) Not at all         7/15/2025   Nutrition   Three or more servings of calcium each day? Yes   Diet: Regular (no restrictions)   How many servings of fruit and vegetables per day? (!) 2-3   How many sweetened beverages each day? 0-1         7/15/2025   Exercise   Days per week of moderate/strenous exercise 7 days         7/15/2025   Social Factors   Frequency of gathering with friends or relatives More than three times a week   Worry food won't last until get money to buy more No   Food not last or not have enough money for food? No   Do you have housing? (Housing is defined as stable permanent housing and does not include staying outside in a car, in a tent, in an abandoned building, in an overnight shelter, or couch-surfing.) Yes   Are you worried about losing your housing? No   Lack of transportation? Patient declined   Unable to get utilities (heat,electricity)? No         7/15/2025   Dental   Dentist two times every year? (!) DECLINE         Today's PHQ-2 Score:       7/15/2025    10:16 AM   PHQ-2 ( 1999 [employer])   Q1: Little interest or pleasure in doing things 0  "  Q2: Feeling down, depressed or hopeless 0   PHQ-2 Score 0    Q1: Little interest or pleasure in doing things Not at all   Q2: Feeling down, depressed or hopeless Not at all   PHQ-2 Score 0       Patient-reported           7/15/2025   Substance Use   Alcohol more than 3/day or more than 7/wk Not Applicable   Do you use any other substances recreationally? No     Social History     Tobacco Use    Smoking status: Never    Smokeless tobacco: Never   Vaping Use    Vaping status: Never Used   Substance Use Topics    Alcohol use: Not Currently    Drug use: Never                  7/15/2025   STI Screening   New sexual partner(s) since last STI/HIV test? No     History of abnormal Pap smear: No - age 30- 64 PAP with HPV every 5 years recommended        Latest Ref Rng & Units 11/26/2024     1:25 PM   PAP / HPV   PAP  Negative for Intraepithelial Lesion or Malignancy (NILM)    HPV 16 DNA Negative Negative    HPV 18 DNA Negative Negative    Other HR HPV Negative Negative            7/15/2025   Contraception/Family Planning   Questions about contraception or family planning No   What are your periods like? Not currently having periods        Reviewed and updated as needed this visit by Provider                           Objective    Exam  /62 (BP Location: Right arm, Patient Position: Sitting, Cuff Size: Adult Regular)   Pulse 101   Temp 97  F (36.1  C) (Temporal)   Resp 22   Ht 1.607 m (5' 3.25\")   Wt 72.4 kg (159 lb 11.2 oz)   LMP  (LMP Unknown)   SpO2 99%   Breastfeeding Yes   BMI 28.07 kg/m     Estimated body mass index is 28.07 kg/m  as calculated from the following:    Height as of this encounter: 1.607 m (5' 3.25\").    Weight as of this encounter: 72.4 kg (159 lb 11.2 oz).    Physical Exam  GENERAL: alert and no distress  EYES: Eyes grossly normal to inspection, PERRL and conjunctivae and sclerae normal  HENT: ear canals and TM's normal, nose and mouth without ulcers or lesions  NECK: no adenopathy, no " asymmetry, masses, or scars  RESP: lungs clear to auscultation - no rales, rhonchi or wheezes  CV: regular rate and rhythm, normal S1 S2, no S3 or S4, no murmur, click or rub, no peripheral edema  ABDOMEN: soft, nontender, no hepatosplenomegaly, no masses and bowel sounds normal  MS: no gross musculoskeletal defects noted, no edema  SKIN: no suspicious lesions or rashes  NEURO: Normal strength and tone, mentation intact and speech normal  PSYCH: mentation appears normal, affect normal/bright        Signed Electronically by: Brett La MD, MD

## 2025-07-15 NOTE — PATIENT INSTRUCTIONS
Patient Education   Preventive Care Advice   This is general advice given by our system to help you stay healthy. However, your care team may have specific advice just for you. Please talk to your care team about your preventive care needs.  Nutrition  Eat 5 or more servings of fruits and vegetables each day.  Try wheat bread, brown rice and whole grain pasta (instead of white bread, rice, and pasta).  Get enough calcium and vitamin D. Check the label on foods and aim for 100% of the RDA (recommended daily allowance).  Lifestyle  Exercise at least 150 minutes each week  (30 minutes a day, 5 days a week).  Do muscle strengthening activities 2 days a week. These help control your weight and prevent disease.  No smoking.  Wear sunscreen to prevent skin cancer.  Have a dental exam and cleaning every 6 months.  Yearly exams  See your health care team every year to talk about:  Any changes in your health.  Any medicines your care team has prescribed.  Preventive care, family planning, and ways to prevent chronic diseases.  Shots (vaccines)   HPV shots (up to age 26), if you've never had them before.  Hepatitis B shots (up to age 59), if you've never had them before.  COVID-19 shot: Get this shot when it's due.  Flu shot: Get a flu shot every year.  Tetanus shot: Get a tetanus shot every 10 years.  Pneumococcal, hepatitis A, and RSV shots: Ask your care team if you need these based on your risk.  Shingles shot (for age 50 and up)  General health tests  Diabetes screening:  Starting at age 35, Get screened for diabetes at least every 3 years.  If you are younger than age 35, ask your care team if you should be screened for diabetes.  Cholesterol test: At age 39, start having a cholesterol test every 5 years, or more often if advised.  Bone density scan (DEXA): At age 50, ask your care team if you should have this scan for osteoporosis (brittle bones).  Hepatitis C: Get tested at least once in your life.  STIs (sexually  transmitted infections)  Before age 24: Ask your care team if you should be screened for STIs.  After age 24: Get screened for STIs if you're at risk. You are at risk for STIs (including HIV) if:  You are sexually active with more than one person.  You don't use condoms every time.  You or a partner was diagnosed with a sexually transmitted infection.  If you are at risk for HIV, ask about PrEP medicine to prevent HIV.  Get tested for HIV at least once in your life, whether you are at risk for HIV or not.  Cancer screening tests  Cervical cancer screening: If you have a cervix, begin getting regular cervical cancer screening tests starting at age 21.  Breast cancer scan (mammogram): If you've ever had breasts, begin having regular mammograms starting at age 40. This is a scan to check for breast cancer.  Colon cancer screening: It is important to start screening for colon cancer at age 45.  Have a colonoscopy test every 10 years (or more often if you're at risk) Or, ask your provider about stool tests like a FIT test every year or Cologuard test every 3 years.  To learn more about your testing options, visit:   .  For help making a decision, visit:   https://bit.ly/rp97058.  Prostate cancer screening test: If you have a prostate, ask your care team if a prostate cancer screening test (PSA) at age 55 is right for you.  Lung cancer screening: If you are a current or former smoker ages 50 to 80, ask your care team if ongoing lung cancer screenings are right for you.  For informational purposes only. Not to replace the advice of your health care provider. Copyright   2023 [employer]. All rights reserved. Clinically reviewed by the [facility] Transitions Program. Gynzy [id number] - REV 01/24.

## 2025-07-16 ENCOUNTER — PATIENT OUTREACH (OUTPATIENT)
Dept: CARE COORDINATION | Facility: CLINIC | Age: 35
End: 2025-07-16
Payer: COMMERCIAL

## 2025-08-04 ENCOUNTER — ANCILLARY PROCEDURE (OUTPATIENT)
Dept: ULTRASOUND IMAGING | Facility: CLINIC | Age: 35
End: 2025-08-04
Attending: FAMILY MEDICINE
Payer: COMMERCIAL

## 2025-08-04 DIAGNOSIS — R22.1 NECK SWELLING: ICD-10-CM

## 2025-08-04 DIAGNOSIS — H92.01 RIGHT EAR PAIN: ICD-10-CM

## 2025-08-04 PROCEDURE — 76536 US EXAM OF HEAD AND NECK: CPT | Mod: GC | Performed by: RADIOLOGY

## (undated) DEVICE — STOCKING SLEEVE COMPRESSION CALF LG

## (undated) DEVICE — SU VICRYL 0 CT-1 36" J346H

## (undated) DEVICE — SOL WATER IRRIG 1000ML BOTTLE 07139-09

## (undated) DEVICE — ESU PENCIL W/SMOKE EVAC NEPTUNE STRYKER 0703-046-000

## (undated) DEVICE — PACK C-SECTION LF PL15OTA83B

## (undated) DEVICE — STRAP KNEE/BODY 31143004

## (undated) DEVICE — SU MONOCRYL 0 CT-1 36" Y346H

## (undated) DEVICE — PREP CHLORAPREP 26ML TINTED ORANGE  260815

## (undated) DEVICE — SOL NACL 0.9% IRRIG 1000ML BOTTLE 07138-09

## (undated) DEVICE — SU MONOCRYL 4-0 PS-2 18" UND Y496G

## (undated) DEVICE — BNDG ABDOMINAL BINDER 10X26-50" 08140145

## (undated) DEVICE — CATH TRAY FOLEY 16FR BARDEX W/DRAIN BAG STATLOCK 300316A

## (undated) DEVICE — DRSG ADAPTIC 3X8" 6113

## (undated) DEVICE — GLOVE ESTEEM POWDER FREE SMT 7.0  2D72PT70

## (undated) DEVICE — GLOVE SENSICARE PI POWDER FREE 7.5 LATEX FREE MSG9075

## (undated) RX ORDER — ONDANSETRON 2 MG/ML
INJECTION INTRAMUSCULAR; INTRAVENOUS
Status: DISPENSED
Start: 2024-10-09

## (undated) RX ORDER — MORPHINE SULFATE 1 MG/ML
INJECTION, SOLUTION EPIDURAL; INTRATHECAL; INTRAVENOUS
Status: DISPENSED
Start: 2024-10-09

## (undated) RX ORDER — KETOROLAC TROMETHAMINE 30 MG/ML
INJECTION, SOLUTION INTRAMUSCULAR; INTRAVENOUS
Status: DISPENSED
Start: 2024-10-09

## (undated) RX ORDER — CEFAZOLIN SODIUM 500 MG/2.2ML
INJECTION, POWDER, FOR SOLUTION INTRAMUSCULAR; INTRAVENOUS
Status: DISPENSED
Start: 2024-08-02

## (undated) RX ORDER — PENICILLIN G POTASSIUM 5000000 [IU]/1
INJECTION, POWDER, FOR SOLUTION INTRAMUSCULAR; INTRAVENOUS
Status: DISPENSED
Start: 2024-08-02

## (undated) RX ORDER — AMPICILLIN 250 MG/1
INJECTION, POWDER, FOR SOLUTION INTRAMUSCULAR; INTRAVENOUS
Status: DISPENSED
Start: 2024-08-02

## (undated) RX ORDER — PHENYLEPHRINE HCL IN 0.9% NACL 50MG/250ML
PLASTIC BAG, INJECTION (ML) INTRAVENOUS
Status: DISPENSED
Start: 2024-10-09

## (undated) RX ORDER — FENTANYL CITRATE 50 UG/ML
INJECTION, SOLUTION INTRAMUSCULAR; INTRAVENOUS
Status: DISPENSED
Start: 2024-10-09

## (undated) RX ORDER — OXYTOCIN/0.9 % SODIUM CHLORIDE 30/500 ML
PLASTIC BAG, INJECTION (ML) INTRAVENOUS
Status: DISPENSED
Start: 2024-10-09